# Patient Record
Sex: MALE | Race: WHITE | URBAN - METROPOLITAN AREA
[De-identification: names, ages, dates, MRNs, and addresses within clinical notes are randomized per-mention and may not be internally consistent; named-entity substitution may affect disease eponyms.]

---

## 2022-03-28 ENCOUNTER — INPATIENT (INPATIENT)
Facility: HOSPITAL | Age: 67
LOS: 1 days | Discharge: HOME | DRG: 189 | End: 2022-03-30
Attending: INTERNAL MEDICINE | Admitting: INTERNAL MEDICINE
Payer: MEDICARE

## 2022-03-28 VITALS — OXYGEN SATURATION: 99 % | HEART RATE: 117 BPM

## 2022-03-28 DIAGNOSIS — J96.02 ACUTE RESPIRATORY FAILURE WITH HYPERCAPNIA: ICD-10-CM

## 2022-03-28 DIAGNOSIS — J45.901 UNSPECIFIED ASTHMA WITH (ACUTE) EXACERBATION: ICD-10-CM

## 2022-03-28 DIAGNOSIS — I10 ESSENTIAL (PRIMARY) HYPERTENSION: ICD-10-CM

## 2022-03-28 DIAGNOSIS — H40.9 UNSPECIFIED GLAUCOMA: ICD-10-CM

## 2022-03-28 DIAGNOSIS — I89.8 OTHER SPECIFIED NONINFECTIVE DISORDERS OF LYMPHATIC VESSELS AND LYMPH NODES: ICD-10-CM

## 2022-03-28 DIAGNOSIS — R63.8 OTHER SYMPTOMS AND SIGNS CONCERNING FOOD AND FLUID INTAKE: ICD-10-CM

## 2022-03-28 DIAGNOSIS — M10.9 GOUT, UNSPECIFIED: ICD-10-CM

## 2022-03-28 DIAGNOSIS — R74.8 ABNORMAL LEVELS OF OTHER SERUM ENZYMES: ICD-10-CM

## 2022-03-28 LAB
ALBUMIN SERPL ELPH-MCNC: 5.1 G/DL — HIGH (ref 3.3–5)
ALP SERPL-CCNC: 139 U/L — HIGH (ref 40–120)
ALT FLD-CCNC: 24 U/L — SIGNIFICANT CHANGE UP (ref 10–45)
ANION GAP SERPL CALC-SCNC: 18 MMOL/L — HIGH (ref 5–17)
ANISOCYTOSIS BLD QL: SLIGHT — SIGNIFICANT CHANGE UP
APTT BLD: 31.3 SEC — SIGNIFICANT CHANGE UP (ref 27.5–35.5)
AST SERPL-CCNC: 35 U/L — SIGNIFICANT CHANGE UP (ref 10–40)
BASE EXCESS BLDV CALC-SCNC: 0.1 MMOL/L — SIGNIFICANT CHANGE UP (ref -2–3)
BASOPHILS # BLD AUTO: 0.45 K/UL — HIGH (ref 0–0.2)
BASOPHILS NFR BLD AUTO: 2.7 % — HIGH (ref 0–2)
BILIRUB SERPL-MCNC: 0.9 MG/DL — SIGNIFICANT CHANGE UP (ref 0.2–1.2)
BUN SERPL-MCNC: 14 MG/DL — SIGNIFICANT CHANGE UP (ref 7–23)
CA-I SERPL-SCNC: 1.22 MMOL/L — SIGNIFICANT CHANGE UP (ref 1.15–1.33)
CALCIUM SERPL-MCNC: 9.7 MG/DL — SIGNIFICANT CHANGE UP (ref 8.4–10.5)
CHLORIDE SERPL-SCNC: 99 MMOL/L — SIGNIFICANT CHANGE UP (ref 96–108)
CO2 BLDV-SCNC: 29.7 MMOL/L — HIGH (ref 22–26)
CO2 SERPL-SCNC: 23 MMOL/L — SIGNIFICANT CHANGE UP (ref 22–31)
CREAT SERPL-MCNC: 0.95 MG/DL — SIGNIFICANT CHANGE UP (ref 0.5–1.3)
CRP SERPL-MCNC: 6 MG/L — HIGH (ref 0–4)
EGFR: 89 ML/MIN/1.73M2 — SIGNIFICANT CHANGE UP
EOSINOPHIL # BLD AUTO: 0 K/UL — SIGNIFICANT CHANGE UP (ref 0–0.5)
EOSINOPHIL NFR BLD AUTO: 0 % — SIGNIFICANT CHANGE UP (ref 0–6)
FERRITIN SERPL-MCNC: 244 NG/ML — SIGNIFICANT CHANGE UP (ref 30–400)
FLUAV AG NPH QL: SIGNIFICANT CHANGE UP
FLUBV AG NPH QL: SIGNIFICANT CHANGE UP
GAS PNL BLDV: 133 MMOL/L — LOW (ref 136–145)
GAS PNL BLDV: SIGNIFICANT CHANGE UP
GIANT PLATELETS BLD QL SMEAR: PRESENT — SIGNIFICANT CHANGE UP
GLUCOSE BLDC GLUCOMTR-MCNC: 148 MG/DL — HIGH (ref 70–99)
GLUCOSE SERPL-MCNC: 126 MG/DL — HIGH (ref 70–99)
HCO3 BLDV-SCNC: 28 MMOL/L — SIGNIFICANT CHANGE UP (ref 22–29)
HCT VFR BLD CALC: 47.7 % — SIGNIFICANT CHANGE UP (ref 39–50)
HGB BLD-MCNC: 16.5 G/DL — SIGNIFICANT CHANGE UP (ref 13–17)
INR BLD: 1.01 — SIGNIFICANT CHANGE UP (ref 0.88–1.16)
LYMPHOCYTES # BLD AUTO: 33.9 % — SIGNIFICANT CHANGE UP (ref 13–44)
LYMPHOCYTES # BLD AUTO: 5.65 K/UL — HIGH (ref 1–3.3)
MACROCYTES BLD QL: SLIGHT — SIGNIFICANT CHANGE UP
MANUAL SMEAR VERIFICATION: SIGNIFICANT CHANGE UP
MCHC RBC-ENTMCNC: 34.6 GM/DL — SIGNIFICANT CHANGE UP (ref 32–36)
MCHC RBC-ENTMCNC: 35 PG — HIGH (ref 27–34)
MCV RBC AUTO: 101.1 FL — HIGH (ref 80–100)
MONOCYTES # BLD AUTO: 1.48 K/UL — HIGH (ref 0–0.9)
MONOCYTES NFR BLD AUTO: 8.9 % — SIGNIFICANT CHANGE UP (ref 2–14)
NEUTROPHILS # BLD AUTO: 7.6 K/UL — HIGH (ref 1.8–7.4)
NEUTROPHILS NFR BLD AUTO: 44.7 % — SIGNIFICANT CHANGE UP (ref 43–77)
NEUTS BAND # BLD: 0.9 % — SIGNIFICANT CHANGE UP (ref 0–8)
NT-PROBNP SERPL-SCNC: 315 PG/ML — HIGH (ref 0–300)
PCO2 BLDV: 58 MMHG — HIGH (ref 42–55)
PH BLDV: 7.29 — LOW (ref 7.32–7.43)
PLAT MORPH BLD: NORMAL — SIGNIFICANT CHANGE UP
PLATELET # BLD AUTO: 174 K/UL — SIGNIFICANT CHANGE UP (ref 150–400)
PO2 BLDV: <29 MMHG — LOW (ref 25–45)
POTASSIUM BLDV-SCNC: 4.1 MMOL/L — SIGNIFICANT CHANGE UP (ref 3.5–5.1)
POTASSIUM SERPL-MCNC: 4.6 MMOL/L — SIGNIFICANT CHANGE UP (ref 3.5–5.3)
POTASSIUM SERPL-SCNC: 4.6 MMOL/L — SIGNIFICANT CHANGE UP (ref 3.5–5.3)
PROCALCITONIN SERPL-MCNC: 0.07 NG/ML — SIGNIFICANT CHANGE UP (ref 0.02–0.1)
PROT SERPL-MCNC: 7.7 G/DL — SIGNIFICANT CHANGE UP (ref 6–8.3)
PROTHROM AB SERPL-ACNC: 12 SEC — SIGNIFICANT CHANGE UP (ref 10.5–13.4)
RBC # BLD: 4.72 M/UL — SIGNIFICANT CHANGE UP (ref 4.2–5.8)
RBC # FLD: 12.8 % — SIGNIFICANT CHANGE UP (ref 10.3–14.5)
RBC BLD AUTO: ABNORMAL
RSV RNA NPH QL NAA+NON-PROBE: SIGNIFICANT CHANGE UP
SAO2 % BLDV: 36.7 % — LOW (ref 67–88)
SARS-COV-2 RNA SPEC QL NAA+PROBE: SIGNIFICANT CHANGE UP
SODIUM SERPL-SCNC: 140 MMOL/L — SIGNIFICANT CHANGE UP (ref 135–145)
SPHEROCYTES BLD QL SMEAR: SLIGHT — SIGNIFICANT CHANGE UP
TROPONIN T SERPL-MCNC: 0.01 NG/ML — SIGNIFICANT CHANGE UP (ref 0–0.01)
VARIANT LYMPHS # BLD: 8.9 % — HIGH (ref 0–6)
WBC # BLD: 16.66 K/UL — HIGH (ref 3.8–10.5)
WBC # FLD AUTO: 16.66 K/UL — HIGH (ref 3.8–10.5)

## 2022-03-28 PROCEDURE — 99291 CRITICAL CARE FIRST HOUR: CPT

## 2022-03-28 PROCEDURE — 71275 CT ANGIOGRAPHY CHEST: CPT | Mod: 26,MA

## 2022-03-28 PROCEDURE — 74174 CTA ABD&PLVS W/CONTRAST: CPT | Mod: 26,MA

## 2022-03-28 PROCEDURE — 99292 CRITICAL CARE ADDL 30 MIN: CPT

## 2022-03-28 PROCEDURE — 99223 1ST HOSP IP/OBS HIGH 75: CPT | Mod: GC

## 2022-03-28 PROCEDURE — 71045 X-RAY EXAM CHEST 1 VIEW: CPT | Mod: 26

## 2022-03-28 PROCEDURE — 93010 ELECTROCARDIOGRAM REPORT: CPT

## 2022-03-28 RX ORDER — MONTELUKAST 4 MG/1
10 TABLET, CHEWABLE ORAL DAILY
Refills: 0 | Status: DISCONTINUED | OUTPATIENT
Start: 2022-03-28 | End: 2022-03-30

## 2022-03-28 RX ORDER — LABETALOL HCL 100 MG
10 TABLET ORAL ONCE
Refills: 0 | Status: COMPLETED | OUTPATIENT
Start: 2022-03-28 | End: 2022-03-28

## 2022-03-28 RX ORDER — DEXTROSE 50 % IN WATER 50 %
12.5 SYRINGE (ML) INTRAVENOUS ONCE
Refills: 0 | Status: DISCONTINUED | OUTPATIENT
Start: 2022-03-28 | End: 2022-03-30

## 2022-03-28 RX ORDER — ALLOPURINOL 300 MG
1 TABLET ORAL
Qty: 0 | Refills: 0 | DISCHARGE

## 2022-03-28 RX ORDER — BUDESONIDE AND FORMOTEROL FUMARATE DIHYDRATE 160; 4.5 UG/1; UG/1
2 AEROSOL RESPIRATORY (INHALATION)
Refills: 0 | Status: DISCONTINUED | OUTPATIENT
Start: 2022-03-28 | End: 2022-03-30

## 2022-03-28 RX ORDER — ALLOPURINOL 300 MG
200 TABLET ORAL ONCE
Refills: 0 | Status: COMPLETED | OUTPATIENT
Start: 2022-03-28 | End: 2022-03-28

## 2022-03-28 RX ORDER — LATANOPROST 0.05 MG/ML
1 SOLUTION/ DROPS OPHTHALMIC; TOPICAL AT BEDTIME
Refills: 0 | Status: DISCONTINUED | OUTPATIENT
Start: 2022-03-28 | End: 2022-03-30

## 2022-03-28 RX ORDER — INSULIN LISPRO 100/ML
VIAL (ML) SUBCUTANEOUS
Refills: 0 | Status: DISCONTINUED | OUTPATIENT
Start: 2022-03-28 | End: 2022-03-30

## 2022-03-28 RX ORDER — DEXTROSE 50 % IN WATER 50 %
25 SYRINGE (ML) INTRAVENOUS ONCE
Refills: 0 | Status: DISCONTINUED | OUTPATIENT
Start: 2022-03-28 | End: 2022-03-30

## 2022-03-28 RX ORDER — IPRATROPIUM/ALBUTEROL SULFATE 18-103MCG
3 AEROSOL WITH ADAPTER (GRAM) INHALATION EVERY 4 HOURS
Refills: 0 | Status: DISCONTINUED | OUTPATIENT
Start: 2022-03-28 | End: 2022-03-28

## 2022-03-28 RX ORDER — BUDESONIDE AND FORMOTEROL FUMARATE DIHYDRATE 160; 4.5 UG/1; UG/1
2 AEROSOL RESPIRATORY (INHALATION)
Refills: 0 | Status: DISCONTINUED | OUTPATIENT
Start: 2022-03-28 | End: 2022-03-28

## 2022-03-28 RX ORDER — IPRATROPIUM/ALBUTEROL SULFATE 18-103MCG
3 AEROSOL WITH ADAPTER (GRAM) INHALATION EVERY 6 HOURS
Refills: 0 | Status: DISCONTINUED | OUTPATIENT
Start: 2022-03-28 | End: 2022-03-28

## 2022-03-28 RX ORDER — SODIUM CHLORIDE 9 MG/ML
1000 INJECTION, SOLUTION INTRAVENOUS
Refills: 0 | Status: DISCONTINUED | OUTPATIENT
Start: 2022-03-28 | End: 2022-03-30

## 2022-03-28 RX ORDER — LATANOPROST 0.05 MG/ML
1 SOLUTION/ DROPS OPHTHALMIC; TOPICAL
Qty: 0 | Refills: 0 | DISCHARGE

## 2022-03-28 RX ORDER — ALLOPURINOL 300 MG
100 TABLET ORAL DAILY
Refills: 0 | Status: DISCONTINUED | OUTPATIENT
Start: 2022-03-28 | End: 2022-03-28

## 2022-03-28 RX ORDER — BRIMONIDINE TARTRATE 2 MG/MG
1 SOLUTION/ DROPS OPHTHALMIC
Qty: 0 | Refills: 0 | DISCHARGE

## 2022-03-28 RX ORDER — IPRATROPIUM/ALBUTEROL SULFATE 18-103MCG
3 AEROSOL WITH ADAPTER (GRAM) INHALATION
Refills: 0 | Status: COMPLETED | OUTPATIENT
Start: 2022-03-28 | End: 2022-03-28

## 2022-03-28 RX ORDER — TIMOLOL 0.5 %
1 DROPS OPHTHALMIC (EYE)
Refills: 0 | Status: DISCONTINUED | OUTPATIENT
Start: 2022-03-28 | End: 2022-03-30

## 2022-03-28 RX ORDER — HYDRALAZINE HCL 50 MG
10 TABLET ORAL ONCE
Refills: 0 | Status: COMPLETED | OUTPATIENT
Start: 2022-03-28 | End: 2022-03-28

## 2022-03-28 RX ORDER — COLCHICINE 0.6 MG
1 TABLET ORAL
Qty: 0 | Refills: 0 | DISCHARGE

## 2022-03-28 RX ORDER — ACETAMINOPHEN 500 MG
650 TABLET ORAL EVERY 6 HOURS
Refills: 0 | Status: DISCONTINUED | OUTPATIENT
Start: 2022-03-28 | End: 2022-03-30

## 2022-03-28 RX ORDER — COLCHICINE 0.6 MG
0.6 TABLET ORAL DAILY
Refills: 0 | Status: DISCONTINUED | OUTPATIENT
Start: 2022-03-28 | End: 2022-03-30

## 2022-03-28 RX ORDER — GLUCAGON INJECTION, SOLUTION 0.5 MG/.1ML
1 INJECTION, SOLUTION SUBCUTANEOUS ONCE
Refills: 0 | Status: DISCONTINUED | OUTPATIENT
Start: 2022-03-28 | End: 2022-03-30

## 2022-03-28 RX ORDER — TIMOLOL 0.5 %
1 DROPS OPHTHALMIC (EYE)
Qty: 0 | Refills: 0 | DISCHARGE

## 2022-03-28 RX ORDER — AMLODIPINE BESYLATE 2.5 MG/1
5 TABLET ORAL EVERY 24 HOURS
Refills: 0 | Status: DISCONTINUED | OUTPATIENT
Start: 2022-03-28 | End: 2022-03-30

## 2022-03-28 RX ORDER — FUROSEMIDE 40 MG
40 TABLET ORAL ONCE
Refills: 0 | Status: COMPLETED | OUTPATIENT
Start: 2022-03-28 | End: 2022-03-28

## 2022-03-28 RX ORDER — DEXTROSE 50 % IN WATER 50 %
15 SYRINGE (ML) INTRAVENOUS ONCE
Refills: 0 | Status: DISCONTINUED | OUTPATIENT
Start: 2022-03-28 | End: 2022-03-30

## 2022-03-28 RX ORDER — LEVALBUTEROL 1.25 MG/.5ML
0.63 SOLUTION, CONCENTRATE RESPIRATORY (INHALATION)
Refills: 0 | Status: COMPLETED | OUTPATIENT
Start: 2022-03-28 | End: 2022-03-28

## 2022-03-28 RX ORDER — LEVALBUTEROL 1.25 MG/.5ML
0.63 SOLUTION, CONCENTRATE RESPIRATORY (INHALATION) EVERY 6 HOURS
Refills: 0 | Status: DISCONTINUED | OUTPATIENT
Start: 2022-03-28 | End: 2022-03-30

## 2022-03-28 RX ORDER — IPRATROPIUM BROMIDE 0.2 MG/ML
500 SOLUTION, NON-ORAL INHALATION EVERY 6 HOURS
Refills: 0 | Status: DISCONTINUED | OUTPATIENT
Start: 2022-03-28 | End: 2022-03-30

## 2022-03-28 RX ORDER — BRIMONIDINE TARTRATE 2 MG/MG
1 SOLUTION/ DROPS OPHTHALMIC THREE TIMES A DAY
Refills: 0 | Status: DISCONTINUED | OUTPATIENT
Start: 2022-03-28 | End: 2022-03-30

## 2022-03-28 RX ORDER — LABETALOL HCL 100 MG
20 TABLET ORAL ONCE
Refills: 0 | Status: COMPLETED | OUTPATIENT
Start: 2022-03-28 | End: 2022-03-28

## 2022-03-28 RX ORDER — ALLOPURINOL 300 MG
300 TABLET ORAL EVERY 24 HOURS
Refills: 0 | Status: DISCONTINUED | OUTPATIENT
Start: 2022-03-29 | End: 2022-03-30

## 2022-03-28 RX ORDER — MONTELUKAST 4 MG/1
1 TABLET, CHEWABLE ORAL
Qty: 0 | Refills: 0 | DISCHARGE

## 2022-03-28 RX ADMIN — LEVALBUTEROL 0.63 MILLIGRAM(S): 1.25 SOLUTION, CONCENTRATE RESPIRATORY (INHALATION) at 16:25

## 2022-03-28 RX ADMIN — Medication 20 MILLIGRAM(S): at 15:52

## 2022-03-28 RX ADMIN — LEVALBUTEROL 0.63 MILLIGRAM(S): 1.25 SOLUTION, CONCENTRATE RESPIRATORY (INHALATION) at 16:35

## 2022-03-28 RX ADMIN — Medication 1 DROP(S): at 23:41

## 2022-03-28 RX ADMIN — BUDESONIDE AND FORMOTEROL FUMARATE DIHYDRATE 2 PUFF(S): 160; 4.5 AEROSOL RESPIRATORY (INHALATION) at 23:24

## 2022-03-28 RX ADMIN — Medication 1.25 MILLIGRAM(S): at 23:04

## 2022-03-28 RX ADMIN — Medication 3 MILLILITER(S): at 15:50

## 2022-03-28 RX ADMIN — LEVALBUTEROL 0.63 MILLIGRAM(S): 1.25 SOLUTION, CONCENTRATE RESPIRATORY (INHALATION) at 16:30

## 2022-03-28 RX ADMIN — Medication 0.6 MILLIGRAM(S): at 23:24

## 2022-03-28 RX ADMIN — BRIMONIDINE TARTRATE 1 DROP(S): 2 SOLUTION/ DROPS OPHTHALMIC at 23:26

## 2022-03-28 RX ADMIN — Medication 3 MILLILITER(S): at 16:10

## 2022-03-28 RX ADMIN — MONTELUKAST 10 MILLIGRAM(S): 4 TABLET, CHEWABLE ORAL at 23:03

## 2022-03-28 RX ADMIN — LATANOPROST 1 DROP(S): 0.05 SOLUTION/ DROPS OPHTHALMIC; TOPICAL at 23:25

## 2022-03-28 RX ADMIN — Medication 0.25 MILLIGRAM(S): at 16:21

## 2022-03-28 RX ADMIN — Medication 10 MILLIGRAM(S): at 15:37

## 2022-03-28 RX ADMIN — Medication 200 MILLIGRAM(S): at 23:04

## 2022-03-28 RX ADMIN — Medication 10 MILLIGRAM(S): at 16:15

## 2022-03-28 RX ADMIN — Medication 3 MILLILITER(S): at 15:30

## 2022-03-28 RX ADMIN — Medication 100 MILLIGRAM(S): at 21:04

## 2022-03-28 RX ADMIN — AMLODIPINE BESYLATE 5 MILLIGRAM(S): 2.5 TABLET ORAL at 19:59

## 2022-03-28 RX ADMIN — Medication 40 MILLIGRAM(S): at 16:03

## 2022-03-28 NOTE — ED ADULT NURSE REASSESSMENT NOTE - NS ED NURSE REASSESS COMMENT FT1
received pt from ABILIO Lord, pt not in any acute distress at this time. continues on bipap, tolerating treatment, continues on cardiac monitor. pt pending disposition

## 2022-03-28 NOTE — H&P ADULT - PROBLEM SELECTOR PLAN 2
Hx of HTN and was on amlodipine 2 years ago. BP was reportedly normal at urgent care, so may be acute rise. P/w 229/169 (s/p IV hydralazine 10mg, labetalol 30mg) --->156/96  -start amlodipine 5mg and titrate up  -PRN hydralazine/ labetalol if persistently hypertensive Hx of HTN and was on amlodipine 2 years ago. BP was reportedly normal at urgent care, so may be acute rise. P/w 229/169 (s/p IV hydralazine 10mg, labetalol 30mg) --->156/96  - Pt hypertensive 200s/100s, asymptomatic -- given Enaliprat 1.25mg IVP x1   -start amlodipine 5mg and titrate up  -PRN hydralazine/ labetalol if persistently hypertensive

## 2022-03-28 NOTE — ED ADULT NURSE NOTE - CHIEF COMPLAINT QUOTE
Pt BIBA from [facility] MD after shortness of breath starting last night. Hx of asthma, pt with wheezing and tachypnea on arrival to . Pt endorses chest tightness, breathing worsening overnight with +orthopnea. Pt placed on CPAP by EMS< given "3 tabs of steroids" at  and 60mg solumderol by EMS. Pt received x2 combivent and 2g IV mag. Pt hypertensive, no LE edema, no hx of heart failure. +expectorant, +cough.

## 2022-03-28 NOTE — H&P ADULT - PROBLEM SELECTOR PLAN 5
On allopurinol and colchicine. Follows w/ outpt rheum  -c/w home allopurinol and colchicine for gout ppx

## 2022-03-28 NOTE — H&P ADULT - PROBLEM SELECTOR PLAN 7
FEN: replete mag>2 and K>4  Code status: FULL    Dispo: [facility]  Lazaro: No  Access/Lines: IV  Sepsis: No

## 2022-03-28 NOTE — H&P ADULT - PROBLEM SELECTOR PLAN 4
Hx of Gout. On allopurinol and colchicine Calcified hilar and mediastinal lymph nodes + splenic calcification on CT: Incidental findings. Pt is covid vaccinated and never smoker. Says he traveled to [place] 40 years ago where he had illness only w/ diarrhea. Denies hemoptysis, fever, chills, cough, TB infection at that time. Differential is broad and includes hemangioma/lymphoma/sarcoid/prior infection w/ histo/TB, ect.  -obtain further collateral information from allergiest/immunologist   -pulm f/u on dc

## 2022-03-28 NOTE — ED PROVIDER NOTE - CLINICAL SUMMARY MEDICAL DECISION MAKING FREE TEXT BOX
Pt p/w SOB, preceding URI. No known f/c. Vaccinated w/o boosted against COVID. No recent travel or sick contacts. Never smoker. No hospitalized / intubated for asthma. No known CAD / CHF. Uncontrolled HTN w/ prior hx, off meds stating b/c BP improved. No peripheral edema. No failure noted on CXR. Symmetric pulses and BPs b/l but ext w/o mottled, cyanotic appearance, w/o hypoxia. Back pain w/o other sx. Bedside echo no sig pericardial effusion.   Given severe HTN, sig resp distress and back pain, consider also dissection, PE, hypertensive urgency / emergency, less likely decompensated HF, in addition asthma exacerbation. R/o COVID19 other resp viral illness.   Alcohol abuse, denies hx w/d sx.

## 2022-03-28 NOTE — CONSULT NOTE ADULT - SUBJECTIVE AND OBJECTIVE BOX
incomplete HPI: 68 yo M PMH HTN, HLD, gout, asthma (on Symbicort + montelukast - no prior flare or intubation), glaucoma BIBEMS from urgent care w/ sob and wheezing the last 3 days. Pt says he had sob and wheezing the last 3 days that got really bad today where he had difficulty breathing. He says he had allergies w/ painful sinuses 7 days ago. Admits to lots of sinus/nasal secretions at that time. Denies fever, chills, productive cough sore throat. Pt also says he has not been taking his Symbicort as he ran out the last 3 weeks. Pt says he is having insurance issues and was not able to pay for the medication as it was too expensive. Pt initially went to urgent care where he was given prednisone 60mg and duonebs w/ no improvement. EMS gave pt solumedrol 60mg and 2mg mag and placed on bipap in transit to hospital. Pt says he also had some low back pain that is feeling better now. Says he is covid vaccinated, and never smoker. Drinks 10oz vodka/week, no hx of alcohol withdrawal. Says he follows w/ a allergist Dr. Michael Phillips, but does not have a regular PCP. Says he had BP problems in the past and was on amlodipine and statin, however, stopped taking as home BP checks was normal. Stopped taking statin 2/2 myalgias. Pt not aware of CT findings of calcifications in spleen and lymph nodes, says he traveled to [place] 40 years ago where he had illness only w/ diarrhea. Denies hemoptysis, fever, chills, cough, TB infection at that time.     Consult reason: hypercapnic respiratory failure requiring bipap  ED vitals: 99F, 229/169--->156/96  Labs significant: WBC 16.6, alk phos=139, Peak flow = 250, VBG PH 7.29, PCO2 58, HCO3 23 (no prior), flu/RSV/covid neg  Imaging/EK. EKG sinus tachycardia  2. CT Chest/Abd: No PE or dissection. Calcified hilar and mediastinal lymph nodes + splenic calcification  3. CXR: hyperinflated lungs     -At urgent care/EMS: Urgent care given prednisone 60mg and duonebs w/ no improvement, EMS gave pt solumedrol 60mg and 2mg mag and placed on bipap in transit to hospital.      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 Admits to marijuana use. Drinks about 10oz of vodka/week, no hx of alcohol withdrawal.        PAST MEDICAL & SURGICAL HISTORY:      FAMILY HISTORY:  :    No known cardiovascular or pulmonary family history     ROS:  See HPI     PHYSICAL EXAM    ICU Vital Signs Last 24 Hrs  T(C): 37.5 (28 Mar 2022 16:49), Max: 37.5 (28 Mar 2022 16:49)  T(F): 99.5 (28 Mar 2022 16:49), Max: 99.5 (28 Mar 2022 16:49)  HR: 98 (28 Mar 2022 19:08) (94 - 120)  BP: 179/99 (28 Mar 2022 19:08) (156/96 - 240/164)  BP(mean): --  ABP: --  ABP(mean): --  RR: 20 (28 Mar 2022 19:08) (20 - 28)  SpO2: 96% (28 Mar 2022 19:08) (96% - 100%)      General: breathing comfortably on nasal canula, no accessory muscle use, speaking full sentences  HEENT:  +stye R eye               Lungs: b/l expiratory wheezing in upper lung fields, no crackles  Cardiovascular: RRR, no murmur  Gastrointestinal: Soft, non-tender to palpation, normal bowel sounds  Musculoskeletal: No clubbing.  Moves all extremities.    Skin: Warm.  Intact  Neurological: No motor or sensory deficit, A+Ox3      LABS:                          16.5   16.66 )-----------( 174      ( 28 Mar 2022 15:42 )             47.7                                                   140  |  99  |  14  ----------------------------<  126<H>  4.6   |  23  |  0.95    Ca    9.7      28 Mar 2022 15:42    TPro  7.7  /  Alb  5.1<H>  /  TBili  0.9  /  DBili  x   /  AST  35  /  ALT  24  /  AlkPhos  139<H>  03-      PT/INR - ( 28 Mar 2022 15:42 )   PT: 12.0 sec;   INR: 1.01          PTT - ( 28 Mar 2022 15:42 )  PTT:31.3 sec                                           CARDIAC MARKERS ( 28 Mar 2022 15:42 )  x     / 0.01 ng/mL / x     / x     / x                                                LIVER FUNCTIONS - ( 28 Mar 2022 15:42 )  Alb: 5.1 g/dL / Pro: 7.7 g/dL / ALK PHOS: 139 U/L / ALT: 24 U/L / AST: 35 U/L / GGT: x                                                                                                                                           CXR:    ECHO:    MEDICATIONS  (STANDING):    MEDICATIONS  (PRN):

## 2022-03-28 NOTE — H&P ADULT - NSHPPHYSICALEXAM_GEN_ALL_CORE
VITAL SIGNS:  T(C): 36.8 (03-28-22 @ 19:22), Max: 37.5 (03-28-22 @ 16:49)  T(F): 98.3 (03-28-22 @ 19:22), Max: 99.5 (03-28-22 @ 16:49)  HR: 98 (03-28-22 @ 19:08) (94 - 120)  BP: 179/99 (03-28-22 @ 19:08) (156/96 - 240/164)  RR: 20 (03-28-22 @ 19:08) (20 - 28)  SpO2: 96% (03-28-22 @ 19:08) (96% - 100%)      PHYSICAL EXAM:    Constitutional: sitting up in bed, on 4L NC   Eyes: PERRL, EOMI, clear conjunctiva  ENT: no nasal discharge; no oropharyngeal erythema or exudates; MMM  Neck: supple; no JVD   Respiratory: audible wheezing in upper lung fields bilaterally   Cardiac: +S1/S2; RRR; no M/R/G  Gastrointestinal: soft, NT/ND; no rebound or guarding; +BSx4  Extremities: WWP, no clubbing or cyanosis; no peripheral edema  Musculoskeletal: NROM x4; no joint swelling, tenderness or erythema  Vascular: 2+ radial, femoral, DP/PT pulses B/L  Dermatologic: skin warm, dry and intact; no rashes, wounds, or scars  Neurologic: AAOx3; CNII-XII grossly intact; no focal deficits  Psychiatric: affect and characteristics of appearance, verbalizations, behaviors are appropriate

## 2022-03-28 NOTE — ED PROVIDER NOTE - PHYSICAL EXAMINATION
Constitutional: Well appearing, awake, alert, oriented to person, place, time/situation and in mod resp distress  ENMT: Airway patent. Normal MM. no lip cyanosis.   Eyes: Clear bilaterally  Cardiac: tachycardic rate, regular rhythm.  Heart sounds S1, S2.  No murmurs, rubs or gallops. + JVD  Respiratory: Tight. Diffuse exp wheezing. No R/R appreciated. Dec air entry. + increased WOB, tachypnea, and accessory mm use. Pt speaks in short sentences.   Gastrointestinal: Abd soft, NT, ND, NABS. No guarding, rebound, or rigidity. No pulsatile abdominal masses.   Musculoskeletal: Range of motion is not limited. no peripheral edema  Vasc: 2+ pulses x 4 ext. mottled cyanotic appearance x 4 ext   Neuro: Alert and oriented x 3, face symmetric and speech fluent. grossly non focal  Skin: Skin above in vasc. otherwise, warm, dry and intact. No evidence of rash.  Psych: Alert and oriented to person, place, time/situation. normal mood and affect. no apparent risk to self or others.

## 2022-03-28 NOTE — ED ADULT NURSE REASSESSMENT NOTE - NS ED NURSE REASSESS COMMENT FT1
Pt pending ct scan. Waiting for pt to be able to tolerating lying flat. Bipap remains in placed. ED attending and primary/ secondary RN  remain at bedside. Pt placed on cardiac monitor defib pads.

## 2022-03-28 NOTE — ED ADULT NURSE REASSESSMENT NOTE - NS ED NURSE REASSESS COMMENT FT1
Pt wheeled to CT scan accompanied by ED attending and primary/ secondary RNs on cardiac monitor and Bipap. Pt tolerated the procedure. Pt wheeled back to ER. [facility] remains in placed.

## 2022-03-28 NOTE — CONSULT NOTE ADULT - ASSESSMENT
68 yo M PMH HTN, HLD, gout, asthma (on Symbicort + montelukast - no prior flare or intubation), glaucoma BIBEMS from urgent care w/ sob and wheezing the last 3 days found to have hypercapnic respiratory failure 2/2 asthma exacerbation (peak flow 250) 2/2 running out of home Symbicort    # Neuro  - No active issues    # Eyes  1. Glaucoma: Hx of glaucoma  -c/w home Alphagan TID, timolol BID, Latanoprost BID    # Pulm  1. Hypercapnic respiratory failure 2/2 asthma flare: 2/2 running out of home Symbicort combined w/ possible recent URI. S/p prednisone 60mg and duonebs (urgent care), solumedrol 60mg and 2mg mag and placed on bipap in transit to hospital. VBG PH 7.29, PCO2 58, HCO3 23 (no prior), flu/RSV/covid neg. CXR w/ hyperinflated lungs. Current Peak flow=250 (does not know his baseline). Currently breathing comfortably on nasal canula, w/ mild expiratory wheezing  -c/w solumedrol 60mg qd for 5 days for asthma flare  -c/w home Symbicort 160/4.5 -2 puff/BID   -c/w home montelukast 10mg qd  -c/w PRN duoneb q4  -daily peakflow  -bipap at standby     2. Calcified hilar and mediastinal lymph nodes + splenic calcification on CT: Incidental findings. Pt is covid vaccinated and never smoker. Says he traveled to [place] 40 years ago where he had illness only w/ diarrhea. Denies hemoptysis, fever, chills, cough, TB infection at that time. Differential is broad and includes hemangioma/lymphoma/sarcoid/prior infection w/ histo/TB, ect.  -obtain further collateral information from allergiest/immunologist   -pulm f/u on dc    # Cardiac  1. HTN: Hx of HTN and was on amlodipine 2 years ago. BP was reportedly normal at urgent care, so may be acute rise. P/w 229/169 (s/p IV hydralazine 10mg, labetalol 30mg) --->156/96  -start amlodipine 5mg and titrate up  -PRN hydralazine/labetolol if persistently hypertensive    2. Tachycardia: Sinus tachycardia on ekg, may be 2/2 receivng duonebs vs agitation from bipap. No PE on CTA, no signs of infection/sepsis. Does not appear dry on exam.  -monitor HR     # GI  1. Elevated alk phos=139  -f/u GGT    # Renal  - No active issues    # Endo  - No active issues    # Heme  1. Leukocytosis: Most likely 2/2 recent steroid use for asthma. No signs of infection.  -trend WBC    #Rheum  1. Gout: Follows w/ outpt rheum  -c/w home allopurinol and colchicine for gout ppx    #ID  - No active issues    PPx: SCD, PADUA=1  FEN: replete mag>2 and K>4  Code status: FULL  Dispo: [facility]  Lazaro: No  Access/Lines: IV  Sepsis: No

## 2022-03-28 NOTE — PATIENT PROFILE ADULT - FUNCTIONAL ASSESSMENT - BASIC MOBILITY SECTION LABEL
Patient became increasingly agitated, pulling at monitoring and trying to get out of bed. Unable to redirect despite RN sitting bedside with patient. [facility] notified, orders received for 0.5 mg Haldol. VSS, will continue to monitor.   .

## 2022-03-28 NOTE — ED ADULT TRIAGE NOTE - CHIEF COMPLAINT QUOTE
Pt BIBA from [facility] MD after shortness of breath starting last night. Hx of asthma, pt with wheezing and tachypnea on arrival to . Pt endorses chest tightness, breathing worsening overnight with +orthopnea. Pt placed on CPAP by EMS< given "3 tabs of steroids" at  and 60mg solumderol by EMS. Pt received x2 combivent and 2g IV mag. Pt hypertensive, no LE edema, no hx of heart failure. +expectorant, +cough.

## 2022-03-28 NOTE — H&P ADULT - PROBLEM SELECTOR PLAN 1
Hypercapnic respiratory failure 2/2 asthma flare 2/2 running out of home Symbicort combined w/ possible recent URI. S/p prednisone 60mg and duonebs (urgent care), solumedrol 60mg and 2mg mag and placed on bipap in transit to hospital. VBG PH 7.29, PCO2 58, HCO3 23 (no prior), flu/RSV/covid neg. CXR w/ hyperinflated lungs. Current Peak flow=250 (does not know his baseline). Currently breathing comfortably on nasal canula, w/ mild expiratory wheezing  -c/w solumedrol 60mg qd for 5 days for asthma flare  -c/w [place] while on steroids and monitor fingersticks   -c/w home Symbicort 160/4.5 -2 puff/BID   -c/w home montelukast 10mg qd  -c/w PRN duoneb q4  -daily peakflow  - BiPAP at bedside if needed     #Leukocytosis  Likely reactive 2/2 i/s/o steroid usage  - continue to monitor

## 2022-03-28 NOTE — PATIENT PROFILE ADULT - FALL HARM RISK - HARM RISK INTERVENTIONS
Assistance with ambulation/Assistance OOB with selected safe patient handling equipment/Communicate Risk of Fall with Harm to all staff/Discuss with provider need for PT consult/Monitor gait and stability/Provide patient with walking aids - walker, cane, crutches/Reinforce activity limits and safety measures with patient and family/Tailored Fall Risk Interventions/Use of alarms - bed, chair and/or voice tab/Visual Cue: Yellow wristband and red socks/Bed in lowest position, wheels locked, appropriate side rails in place/Call bell, personal items and telephone in reach/Instruct patient to call for assistance before getting out of bed or chair/Non-slip footwear when patient is out of bed/[place] to call system/Physically safe environment - no spills, clutter or unnecessary equipment/Purposeful Proactive Rounding/Room/bathroom lighting operational, light cord in reach

## 2022-03-28 NOTE — ED PROVIDER NOTE - PROGRESS NOTE DETAILS
AZEBU  consulted and will see the pt MICU  consulted and will see the pt. pt much improved, continued on BiPAP Pt seen by MICU team, attending Dr Roberts. Admit to [facility]. BiPAP removed, pt placed on nasal canula, maintain BiPAP next to pt on standby in case of need. Keep NPO

## 2022-03-28 NOTE — H&P ADULT - NSHPSOCIALHISTORY_GEN_ALL_CORE
Pt currently unemployed  Denies tobacco use, illicit drug use  Drinks about 10 ounces of alcohol a week (measures his drinks), no hx of withdrawal  functions independently, performs ADLs

## 2022-03-28 NOTE — H&P ADULT - ASSESSMENT
66 yo M PMH HTN, HLD, gout, asthma (on Symbicort + montelukast - no prior flare or intubation), glaucoma BIBEMS from urgent care w/ sob and wheezing the last 3 days found to have hypercapnic respiratory failure 2/2 asthma exacerbation (peak flow 250) 2/2 running out of home Symbicort. Admitted to [facility] for further management.

## 2022-03-28 NOTE — H&P ADULT - HISTORY OF PRESENT ILLNESS
68 yo male with PMHx of gout, asthma, ___ presents to [facility] from urgent care for worsening shortness of breath. Pt ran out of home Symbicort about three weeks prior and has not been taking his home medications. Pt notes he has had worsening SOB. Pt was complaining of back pain as well. Per patient, he has been told that he has hypertension intermittently however has had normal BP readings prior at his allergist and denies being on any current medication for his hypertension.     ED Vitals: /142 --> 179/99; HR ; T 98.3 oral, 99% SpO2 on BiPAP -->96% SpO2 on 4L NC   ED Labs: WBC 16.66, Hgb 16.5, Plt 174, Na 140, K 4.6, BUN 14, Cr 0.95, Alk Phos 139, CRP 6.0, vbg: pH 7.29, CO2 58, O2 <29, Bicarb 28  Imaging: CXR with no acute abnormality; CT angio chest aorta: no dissection or PE, mildly dilated aortic root 3.5cm, numerous splenic small calcified granulomas   ED Interventions: Furosemide 40mg IVP x1, Hydralazine 10mg IVP x1, Labetalol 10mg IVP x1 and 20mg IVP x1, Terbutaline 0.25mg subcutaneous x1, duonebs x3, Levalbuterol inhalation x3    66 yo male with PMHx of gout, asthma, ___ presents to [facility] from urgent care for worsening shortness of breath. At urgent care, patient received duonebs x3 and steroids and was then sent to ED. Pt states that she ran out of home Symbicort about three weeks prior and has not been taking his home medications. Pt notes he has had worsening SOB. Pt was complaining of back pain as well. Per patient, he has been told that he has hypertension intermittently however has had normal BP readings prior at his allergist and denies being on any current medication for his hypertension.     ED Vitals: /142 --> 179/99; HR ; T 98.3 oral, 99% SpO2 on BiPAP -->96% SpO2 on 4L NC   ED Labs: WBC 16.66, Hgb 16.5, Plt 174, Na 140, K 4.6, BUN 14, Cr 0.95, Alk Phos 139, CRP 6.0, vbg: pH 7.29, CO2 58, O2 <29, Bicarb 28  Imaging: CXR with no acute abnormality; CT angio chest aorta: no dissection or PE, mildly dilated aortic root 3.5cm, numerous splenic small calcified granulomas   ED Interventions: Furosemide 40mg IVP x1, Hydralazine 10mg IVP x1, Labetalol 10mg IVP x1 and 20mg IVP x1, Terbutaline 0.25mg subcutaneous x1, duonebs x3, Levalbuterol inhalation x3    66 yo M PMH HTN, HLD, gout, asthma (on Symbicort + montelukast - no prior flare or intubation), glaucoma BIBEMS from urgent care w/ sob and wheezing the last 3 days. Pt says he had sob and wheezing the last 3 days that got really bad today where he had difficulty breathing. He says he had allergies w/ painful sinuses 7 days ago. Admits to lots of sinus/nasal secretions at that time. Denies fever, chills, productive cough sore throat. Pt also says he has not been taking his Symbicort as he ran out the last 3 weeks. Pt says he is having insurance issues and was not able to pay for the medication as it was too expensive. Pt initially went to urgent care where he was given prednisone 60mg and duonebs w/ no improvement. EMS gave pt solumedrol 60mg and 2mg mag and placed on bipap in transit to hospital. Pt says he also had some low back pain that is feeling better now. Says he is covid vaccinated, and never smoker. Drinks 10oz vodka/week, no hx of alcohol withdrawal. Says he follows w/ a allergist Dr. Michael Phillips, but does not have a regular PCP. Says he had BP problems in the past and was on amlodipine and statin, however, stopped taking as home BP checks was normal. Stopped taking statin 2/2 myalgias. Pt not aware of CT findings of calcifications in spleen and lymph nodes, says he traveled to [place] 40 years ago where he had illness only w/ diarrhea. Denies hemoptysis, fever, chills, cough, TB infection at that time.       ED Vitals: /142 --> 179/99; HR ; T 98.3 oral, 99% SpO2 on BiPAP -->96% SpO2 on 4L NC   ED Labs: WBC 16.66, Hgb 16.5, Plt 174, Na 140, K 4.6, BUN 14, Cr 0.95, Alk Phos 139, CRP 6.0, vbg: pH 7.29, CO2 58, O2 <29, Bicarb 28  Imaging: CXR with no acute abnormality; CT angio chest aorta: no dissection or PE, mildly dilated aortic root 3.5cm, numerous splenic small calcified granulomas   ED Interventions: Furosemide 40mg IVP x1, Hydralazine 10mg IVP x1, Labetalol 10mg IVP x1 and 20mg IVP x1, Terbutaline 0.25mg subcutaneous x1, duonebs x3, Levalbuterol inhalation x3

## 2022-03-28 NOTE — ED ADULT NURSE NOTE - OBJECTIVE STATEMENT
Pt is 67 y.o male client BIBA from [facility] complaining of sob and chest tightness from last night. Pt has history of asthma and hypertension. Pt is wheezing, tachypneic and hypertensive upon arrival to urgent care. Pt endorses chest tightness, breathing worsening overnight with orthopnea. Pt placed on CPAP by EMS had 3 tablets of steroids at [facility], had 60 mg iv of solumedrol by EMS, 2grams of IV mag and 2x Combivent. Pt has productive cough, no edema present and pt denies heart failure.

## 2022-03-28 NOTE — H&P ADULT - NSHPLABSRESULTS_GEN_ALL_CORE
.  LABS:                         16.5   16.66 )-----------( 174      ( 28 Mar 2022 15:42 )             47.7     03-28    140  |  99  |  14  ----------------------------<  126<H>  4.6   |  23  |  0.95    Ca    9.7      28 Mar 2022 15:42    TPro  7.7  /  Alb  5.1<H>  /  TBili  0.9  /  DBili  x   /  AST  35  /  ALT  24  /  AlkPhos  139<H>  03-28    PT/INR - ( 28 Mar 2022 15:42 )   PT: 12.0 sec;   INR: 1.01          PTT - ( 28 Mar 2022 15:42 )  PTT:31.3 sec    Serum Pro-Brain Natriuretic Peptide: 315 pg/mL (03-28 @ 15:42)        RADIOLOGY, EKG & ADDITIONAL TESTS: Reviewed. LABS:                         16.5   16.66 )-----------( 174      ( 28 Mar 2022 15:42 )             47.7     03-28    140  |  99  |  14  ----------------------------<  126<H>  4.6   |  23  |  0.95    Ca    9.7      28 Mar 2022 15:42    TPro  7.7  /  Alb  5.1<H>  /  TBili  0.9  /  DBili  x   /  AST  35  /  ALT  24  /  AlkPhos  139<H>  03-28    PT/INR - ( 28 Mar 2022 15:42 )   PT: 12.0 sec;   INR: 1.01          PTT - ( 28 Mar 2022 15:42 )  PTT:31.3 sec    Serum Pro-Brain Natriuretic Peptide: 315 pg/mL (03-28 @ 15:42)        RADIOLOGY, EKG & ADDITIONAL TESTS: Reviewed.

## 2022-03-28 NOTE — ED PROVIDER NOTE - OBJECTIVE STATEMENT
Pt w/ PMHx HTN (off meds x 1-2 years), HLD (no meds), gout, asthma (Symbicort, montelukast), PSHx appendectomy, skin cancer excision, p/w SOB. Sx onset of sinus pressure and congestion 2-3 days ago, w/ progression of dry cough, wheezing, back pain. he reports some diarrhea. He is vaccinated x 2 against COVID19, but never boosted. No recent travel. No known sick contacts. No known hx CAD or CHF. + FHx CAD Dad age 61. he states he had normal stress/echo approx 15 years ago. No hx hospitalizations / intubations. Never smoker. He denies CP. He reports back pain, described as tightness.  Pt went to , given duonebs, Prednisone 60 mg PO  EMS called, given Magnesium Sulfate 2 g IV, and Solumedrol 60 mg IV, and placed on CPAP. Reportedly, pt was never hypoxia. Pt w/ PMHx HTN (off meds x 1-2 years), HLD (no meds), gout, asthma (Symbicort, montelukast), PSHx appendectomy, skin cancer excision, p/w SOB. Sx onset of sinus pressure and congestion approx 10 days ago ago, w/ progression of dry cough approx 1 week ago. Last night into today, pt began having wheezing, SOB, back pain. he reports some diarrhea this morning. He is vaccinated x 2 against COVID19, but never boosted. No recent travel. No known sick contacts. No known hx CAD or CHF. + FHx CAD Dad age 61. he states he had normal stress/echo approx 15 years ago. No hx hospitalizations / intubations. Never smoker. He denies CP. He reports back pain, described as tightness.  Pt went to , given duonebs, Prednisone 60 mg PO  EMS called, given Magnesium Sulfate 2 g IV, and Solumedrol 60 mg IV, and placed on CPAP. Reportedly, pt was never hypoxia.

## 2022-03-29 LAB
A1C WITH ESTIMATED AVERAGE GLUCOSE RESULT: 5 % — SIGNIFICANT CHANGE UP (ref 4–5.6)
ANION GAP SERPL CALC-SCNC: 13 MMOL/L — SIGNIFICANT CHANGE UP (ref 5–17)
BASOPHILS # BLD AUTO: 0.02 K/UL — SIGNIFICANT CHANGE UP (ref 0–0.2)
BASOPHILS NFR BLD AUTO: 0.2 % — SIGNIFICANT CHANGE UP (ref 0–2)
BUN SERPL-MCNC: 22 MG/DL — SIGNIFICANT CHANGE UP (ref 7–23)
CALCIUM SERPL-MCNC: 9 MG/DL — SIGNIFICANT CHANGE UP (ref 8.4–10.5)
CHLORIDE SERPL-SCNC: 102 MMOL/L — SIGNIFICANT CHANGE UP (ref 96–108)
CHOLEST SERPL-MCNC: 202 MG/DL — HIGH
CO2 SERPL-SCNC: 22 MMOL/L — SIGNIFICANT CHANGE UP (ref 22–31)
CREAT SERPL-MCNC: 1 MG/DL — SIGNIFICANT CHANGE UP (ref 0.5–1.3)
EGFR: 84 ML/MIN/1.73M2 — SIGNIFICANT CHANGE UP
EOSINOPHIL # BLD AUTO: 0.01 K/UL — SIGNIFICANT CHANGE UP (ref 0–0.5)
EOSINOPHIL NFR BLD AUTO: 0.1 % — SIGNIFICANT CHANGE UP (ref 0–6)
ESTIMATED AVERAGE GLUCOSE: 97 MG/DL — SIGNIFICANT CHANGE UP (ref 68–114)
GGT SERPL-CCNC: 50 U/L — SIGNIFICANT CHANGE UP (ref 9–50)
GLUCOSE BLDC GLUCOMTR-MCNC: 132 MG/DL — HIGH (ref 70–99)
GLUCOSE BLDC GLUCOMTR-MCNC: 149 MG/DL — HIGH (ref 70–99)
GLUCOSE BLDC GLUCOMTR-MCNC: 152 MG/DL — HIGH (ref 70–99)
GLUCOSE BLDC GLUCOMTR-MCNC: 154 MG/DL — HIGH (ref 70–99)
GLUCOSE SERPL-MCNC: 144 MG/DL — HIGH (ref 70–99)
HCT VFR BLD CALC: 40.5 % — SIGNIFICANT CHANGE UP (ref 39–50)
HCV AB S/CO SERPL IA: 0.04 S/CO — SIGNIFICANT CHANGE UP
HCV AB SERPL-IMP: SIGNIFICANT CHANGE UP
HDLC SERPL-MCNC: 60 MG/DL — SIGNIFICANT CHANGE UP
HGB BLD-MCNC: 13.9 G/DL — SIGNIFICANT CHANGE UP (ref 13–17)
IMM GRANULOCYTES NFR BLD AUTO: 0.5 % — SIGNIFICANT CHANGE UP (ref 0–1.5)
LIPID PNL WITH DIRECT LDL SERPL: 123 MG/DL — HIGH
LYMPHOCYTES # BLD AUTO: 46.1 % — HIGH (ref 13–44)
LYMPHOCYTES # BLD AUTO: 5.93 K/UL — HIGH (ref 1–3.3)
MAGNESIUM SERPL-MCNC: 2.3 MG/DL — SIGNIFICANT CHANGE UP (ref 1.6–2.6)
MCHC RBC-ENTMCNC: 33.7 PG — SIGNIFICANT CHANGE UP (ref 27–34)
MCHC RBC-ENTMCNC: 34.3 GM/DL — SIGNIFICANT CHANGE UP (ref 32–36)
MCV RBC AUTO: 98.3 FL — SIGNIFICANT CHANGE UP (ref 80–100)
MONOCYTES # BLD AUTO: 0.74 K/UL — SIGNIFICANT CHANGE UP (ref 0–0.9)
MONOCYTES NFR BLD AUTO: 5.7 % — SIGNIFICANT CHANGE UP (ref 2–14)
NEUTROPHILS # BLD AUTO: 6.1 K/UL — SIGNIFICANT CHANGE UP (ref 1.8–7.4)
NEUTROPHILS NFR BLD AUTO: 47.4 % — SIGNIFICANT CHANGE UP (ref 43–77)
NON HDL CHOLESTEROL: 142 MG/DL — HIGH
NRBC # BLD: 0 /100 WBCS — SIGNIFICANT CHANGE UP (ref 0–0)
PHOSPHATE SERPL-MCNC: 4.2 MG/DL — SIGNIFICANT CHANGE UP (ref 2.5–4.5)
PLATELET # BLD AUTO: 159 K/UL — SIGNIFICANT CHANGE UP (ref 150–400)
POTASSIUM SERPL-MCNC: 4 MMOL/L — SIGNIFICANT CHANGE UP (ref 3.5–5.3)
POTASSIUM SERPL-SCNC: 4 MMOL/L — SIGNIFICANT CHANGE UP (ref 3.5–5.3)
RBC # BLD: 4.12 M/UL — LOW (ref 4.2–5.8)
RBC # FLD: 13.1 % — SIGNIFICANT CHANGE UP (ref 10.3–14.5)
SODIUM SERPL-SCNC: 137 MMOL/L — SIGNIFICANT CHANGE UP (ref 135–145)
TRIGL SERPL-MCNC: 94 MG/DL — SIGNIFICANT CHANGE UP
WBC # BLD: 12.87 K/UL — HIGH (ref 3.8–10.5)
WBC # FLD AUTO: 12.87 K/UL — HIGH (ref 3.8–10.5)

## 2022-03-29 PROCEDURE — 99233 SBSQ HOSP IP/OBS HIGH 50: CPT

## 2022-03-29 PROCEDURE — 99223 1ST HOSP IP/OBS HIGH 75: CPT | Mod: GC

## 2022-03-29 PROCEDURE — 99233 SBSQ HOSP IP/OBS HIGH 50: CPT | Mod: GC

## 2022-03-29 RX ADMIN — Medication 1: at 23:02

## 2022-03-29 RX ADMIN — MONTELUKAST 10 MILLIGRAM(S): 4 TABLET, CHEWABLE ORAL at 12:44

## 2022-03-29 RX ADMIN — Medication 500 MICROGRAM(S): at 16:30

## 2022-03-29 RX ADMIN — LEVALBUTEROL 0.63 MILLIGRAM(S): 1.25 SOLUTION, CONCENTRATE RESPIRATORY (INHALATION) at 04:46

## 2022-03-29 RX ADMIN — Medication 500 MICROGRAM(S): at 00:33

## 2022-03-29 RX ADMIN — Medication 1 DROP(S): at 18:57

## 2022-03-29 RX ADMIN — LEVALBUTEROL 0.63 MILLIGRAM(S): 1.25 SOLUTION, CONCENTRATE RESPIRATORY (INHALATION) at 22:42

## 2022-03-29 RX ADMIN — Medication 1: at 06:43

## 2022-03-29 RX ADMIN — BUDESONIDE AND FORMOTEROL FUMARATE DIHYDRATE 2 PUFF(S): 160; 4.5 AEROSOL RESPIRATORY (INHALATION) at 18:55

## 2022-03-29 RX ADMIN — Medication 1 DROP(S): at 05:21

## 2022-03-29 RX ADMIN — Medication 500 MICROGRAM(S): at 23:11

## 2022-03-29 RX ADMIN — Medication 0.6 MILLIGRAM(S): at 12:44

## 2022-03-29 RX ADMIN — Medication 60 MILLIGRAM(S): at 12:44

## 2022-03-29 RX ADMIN — BUDESONIDE AND FORMOTEROL FUMARATE DIHYDRATE 2 PUFF(S): 160; 4.5 AEROSOL RESPIRATORY (INHALATION) at 05:21

## 2022-03-29 RX ADMIN — BRIMONIDINE TARTRATE 1 DROP(S): 2 SOLUTION/ DROPS OPHTHALMIC at 05:21

## 2022-03-29 RX ADMIN — Medication 500 MICROGRAM(S): at 10:13

## 2022-03-29 RX ADMIN — AMLODIPINE BESYLATE 5 MILLIGRAM(S): 2.5 TABLET ORAL at 18:55

## 2022-03-29 RX ADMIN — Medication 500 MICROGRAM(S): at 04:47

## 2022-03-29 RX ADMIN — LEVALBUTEROL 0.63 MILLIGRAM(S): 1.25 SOLUTION, CONCENTRATE RESPIRATORY (INHALATION) at 10:13

## 2022-03-29 RX ADMIN — BRIMONIDINE TARTRATE 1 DROP(S): 2 SOLUTION/ DROPS OPHTHALMIC at 22:41

## 2022-03-29 RX ADMIN — LEVALBUTEROL 0.63 MILLIGRAM(S): 1.25 SOLUTION, CONCENTRATE RESPIRATORY (INHALATION) at 16:30

## 2022-03-29 RX ADMIN — BRIMONIDINE TARTRATE 1 DROP(S): 2 SOLUTION/ DROPS OPHTHALMIC at 12:44

## 2022-03-29 NOTE — PROGRESS NOTE ADULT - PROBLEM SELECTOR PLAN 5
On allopurinol and colchicine. Follows w/ outpt rheum  -c/w home allopurinol and colchicine for gout ppx
On allopurinol and colchicine. Follows w/ outpt rheum  -c/w home allopurinol and colchicine for gout ppx

## 2022-03-29 NOTE — PROGRESS NOTE ADULT - SUBJECTIVE AND OBJECTIVE BOX
SHIVA JOSHI, 67y, Male  MRN-[id number]  Patient is a 67y old  Male who presents with a chief complaint of hypercapnic respiratory failure (28 Mar 2022 19:12)      OVERNIGHT EVENTS: Tele called for 30s of atrial fibrillation v MAT, self-resolved. EKG shows sinus tach. No fever, pt asymptomatic. Duoneb changed to Xopenex/ipratropium q8H standing.     SUBJECTIVE: Patient seen and examined at bedside. Reports feeling much better-improved breathing. Denies fevers, chills, HA, chest pain, sob, nausea, vomiting, abdominal pain, diarrhea, constipation, dysuria.     12 Point ROS Negative unless noted otherwise above.  -------------------------------------------------------------------------------  VITAL SIGNS:  Vital Signs Last 24 Hrs  T(C): 36.4 (29 Mar 2022 10:00), Max: 37.5 (28 Mar 2022 16:49)  T(F): 97.6 (29 Mar 2022 10:00), Max: 99.5 (28 Mar 2022 16:49)  HR: 82 (29 Mar 2022 10:40) (80 - 120)  BP: 150/76 (29 Mar 2022 10:40) (115/74 - 240/164)  BP(mean): 104 (29 Mar 2022 10:40) (90 - 148)  RR: 18 (29 Mar 2022 08:41) (18 - 28)  SpO2: 94% (29 Mar 2022 10:40) (94% - 100%)  I&O's Summary      PHYSICAL EXAM:    General: pleasant man in NAD, well developed  HEENT: NC/AT; EOMI, PERRLA, anicteric sclera; dry mucosal membranes.  Neck: supple, trachea midline  Cardiovascular: RRR, +S1/S2; NO M/R/G  Respiratory: poor inspiratory effort, +cough, no wheezing on exam   Gastrointestinal: soft, NT/ND; +BSx4  Extremities: WWP; no edema or cyanosis  Vascular: 2+ radial, DP/PT pulses B/L  Neurological: AAOx3; no focal deficits    ALLERGIES:  Allergies    No Known Allergies    Intolerances        MEDICATIONS:  MEDICATIONS  (STANDING):  allopurinol 300 milliGRAM(s) Oral every 24 hours  amLODIPine   Tablet 5 milliGRAM(s) Oral every 24 hours  brimonidine 0.2% Ophthalmic Solution 1 Drop(s) Both EYES three times a day  budesonide 160 MICROgram(s)/formoterol 4.5 MICROgram(s) Inhaler 2 Puff(s) Inhalation two times a day  colchicine 0.6 milliGRAM(s) Oral daily  dextrose 5%. 1000 milliLiter(s) (100 mL/Hr) IV Continuous <Continuous>  dextrose 5%. 1000 milliLiter(s) (50 mL/Hr) IV Continuous <Continuous>  dextrose 50% Injectable 25 Gram(s) IV Push once  dextrose 50% Injectable 12.5 Gram(s) IV Push once  dextrose 50% Injectable 25 Gram(s) IV Push once  glucagon  Injectable 1 milliGRAM(s) IntraMuscular once  insulin lispro (ADMELOG) corrective regimen sliding scale   SubCutaneous Before meals and at bedtime  ipratropium    for Nebulization 500 MICROGram(s) Nebulizer every 6 hours  latanoprost 0.005% Ophthalmic Solution 1 Drop(s) Both EYES at bedtime  levalbuterol Inhalation 0.63 milliGRAM(s) Inhalation every 6 hours  methylPREDNISolone sodium succinate Injectable 60 milliGRAM(s) IV Push every 24 hours  montelukast 10 milliGRAM(s) Oral daily  timolol 0.5% Solution 1 Drop(s) Both EYES two times a day    MEDICATIONS  (PRN):  acetaminophen     Tablet .. 650 milliGRAM(s) Oral every 6 hours PRN Temp greater or equal to 38C (100.4F), Mild Pain (1 - 3)  dextrose Oral Gel 15 Gram(s) Oral once PRN Blood Glucose LESS THAN 70 milliGRAM(s)/deciliter      -------------------------------------------------------------------------------  LABS:                        13.9   12.87 )-----------( 159      ( 29 Mar 2022 07:26 )             40.5     03-29    137  |  102  |  22  ----------------------------<  144<H>  4.0   |  22  |  1.00    Ca    9.0      29 Mar 2022 07:26  Phos  4.2     03-29  Mg     2.3     03-29    TPro  7.7  /  Alb  5.1<H>  /  TBili  0.9  /  DBili  x   /  AST  35  /  ALT  24  /  AlkPhos  139<H>  03-28    LIVER FUNCTIONS - ( 29 Mar 2022 07:26 )  Alb: x     / Pro: x     / ALK PHOS: x     / ALT: x     / AST: x     / GGT: 50 U/L       PT/INR - ( 28 Mar 2022 15:42 )   PT: 12.0 sec;   INR: 1.01          PTT - ( 28 Mar 2022 15:42 )  PTT:31.3 sec    CAPILLARY BLOOD GLUCOSE      POCT Blood Glucose.: 152 mg/dL (29 Mar 2022 06:32)      Culture - Blood (collected 28 Mar 2022 17:27)  Source: .Blood Blood-Peripheral  Preliminary Report (29 Mar 2022 06:00):    No growth at 12 hours    Culture - Blood (collected 28 Mar 2022 17:27)  Source: .Blood Blood-Peripheral  Preliminary Report (29 Mar 2022 06:00):    No growth at 12 hours          RADIOLOGY & ADDITIONAL TESTS: Reviewed.   Transfer Note from  to [facility]   Hospital Course: 68 yo M PMH HTN, HLD, gout, asthma (on Symbicort + montelukast - no prior flare or intubation), glaucoma BIBEMS from urgent care w/ sob and wheezing the last 3 days found to have hypercapnic respiratory failure 2/2 asthma exacerbation (peak flow 250) 2/2 running out of home Symbicort and URI. In the ED, patient placed on bipap and also given Hydralazine 10mg IVP x1, Labetalol 10mg IVP x1 and 20mg IVP x1 for hypertensive urgency to /142 likely 2/2 asthma exacerbation. Admitted to [facility] where he was weaned off bipap and placed on nc. Started solumedrol 60 for 5 days. Started Noravasc 5. Duonebs changed to standing. Peak flow 210-->240. Blood pressure improved to 160/89.  Pulm consulted for asthma exacerbation. Stable and ready for step down to [facility].       SHIVA JOSHI, 67y, Male  MRN-[id number]  Patient is a 67y old  Male who presents with a chief complaint of hypercapnic respiratory failure (28 Mar 2022 19:12)      OVERNIGHT EVENTS: Tele called for 30s of atrial fibrillation v MAT, self-resolved. EKG shows sinus tach. No fever, pt asymptomatic. Duoneb changed to Xopenex/ipratropium q8H standing.     SUBJECTIVE: Patient seen and examined at bedside. Reports feeling much better-improved breathing. Denies fevers, chills, HA, chest pain, sob, nausea, vomiting, abdominal pain, diarrhea, constipation, dysuria.     12 Point ROS Negative unless noted otherwise above.  -------------------------------------------------------------------------------  VITAL SIGNS:  Vital Signs Last 24 Hrs  T(C): 36.4 (29 Mar 2022 10:00), Max: 37.5 (28 Mar 2022 16:49)  T(F): 97.6 (29 Mar 2022 10:00), Max: 99.5 (28 Mar 2022 16:49)  HR: 82 (29 Mar 2022 10:40) (80 - 120)  BP: 150/76 (29 Mar 2022 10:40) (115/74 - 240/164)  BP(mean): 104 (29 Mar 2022 10:40) (90 - 148)  RR: 18 (29 Mar 2022 08:41) (18 - 28)  SpO2: 94% (29 Mar 2022 10:40) (94% - 100%)  I&O's Summary      PHYSICAL EXAM:    General: pleasant man in NAD, well developed  HEENT: NC/AT; EOMI, PERRLA, anicteric sclera; dry mucosal membranes.  Neck: supple, trachea midline  Cardiovascular: RRR, +S1/S2; NO M/R/G  Respiratory: poor inspiratory effort, +cough, no wheezing on exam   Gastrointestinal: soft, NT/ND; +BSx4  Extremities: WWP; no edema or cyanosis  Vascular: 2+ radial, DP/PT pulses B/L  Neurological: AAOx3; no focal deficits    ALLERGIES:  Allergies    No Known Allergies    Intolerances        MEDICATIONS:  MEDICATIONS  (STANDING):  allopurinol 300 milliGRAM(s) Oral every 24 hours  amLODIPine   Tablet 5 milliGRAM(s) Oral every 24 hours  brimonidine 0.2% Ophthalmic Solution 1 Drop(s) Both EYES three times a day  budesonide 160 MICROgram(s)/formoterol 4.5 MICROgram(s) Inhaler 2 Puff(s) Inhalation two times a day  colchicine 0.6 milliGRAM(s) Oral daily  dextrose 5%. 1000 milliLiter(s) (100 mL/Hr) IV Continuous <Continuous>  dextrose 5%. 1000 milliLiter(s) (50 mL/Hr) IV Continuous <Continuous>  dextrose 50% Injectable 25 Gram(s) IV Push once  dextrose 50% Injectable 12.5 Gram(s) IV Push once  dextrose 50% Injectable 25 Gram(s) IV Push once  glucagon  Injectable 1 milliGRAM(s) IntraMuscular once  insulin lispro (ADMELOG) corrective regimen sliding scale   SubCutaneous Before meals and at bedtime  ipratropium    for Nebulization 500 MICROGram(s) Nebulizer every 6 hours  latanoprost 0.005% Ophthalmic Solution 1 Drop(s) Both EYES at bedtime  levalbuterol Inhalation 0.63 milliGRAM(s) Inhalation every 6 hours  methylPREDNISolone sodium succinate Injectable 60 milliGRAM(s) IV Push every 24 hours  montelukast 10 milliGRAM(s) Oral daily  timolol 0.5% Solution 1 Drop(s) Both EYES two times a day    MEDICATIONS  (PRN):  acetaminophen     Tablet .. 650 milliGRAM(s) Oral every 6 hours PRN Temp greater or equal to 38C (100.4F), Mild Pain (1 - 3)  dextrose Oral Gel 15 Gram(s) Oral once PRN Blood Glucose LESS THAN 70 milliGRAM(s)/deciliter      -------------------------------------------------------------------------------  LABS:                        13.9   12.87 )-----------( 159      ( 29 Mar 2022 07:26 )             40.5     03-29    137  |  102  |  22  ----------------------------<  144<H>  4.0   |  22  |  1.00    Ca    9.0      29 Mar 2022 07:26  Phos  4.2     03-29  Mg     2.3     03-29    TPro  7.7  /  Alb  5.1<H>  /  TBili  0.9  /  DBili  x   /  AST  35  /  ALT  24  /  AlkPhos  139<H>  03-28    LIVER FUNCTIONS - ( 29 Mar 2022 07:26 )  Alb: x     / Pro: x     / ALK PHOS: x     / ALT: x     / AST: x     / GGT: 50 U/L       PT/INR - ( 28 Mar 2022 15:42 )   PT: 12.0 sec;   INR: 1.01          PTT - ( 28 Mar 2022 15:42 )  PTT:31.3 sec    CAPILLARY BLOOD GLUCOSE      POCT Blood Glucose.: 152 mg/dL (29 Mar 2022 06:32)      Culture - Blood (collected 28 Mar 2022 17:27)  Source: .Blood Blood-Peripheral  Preliminary Report (29 Mar 2022 06:00):    No growth at 12 hours    Culture - Blood (collected 28 Mar 2022 17:27)  Source: .Blood Blood-Peripheral  Preliminary Report (29 Mar 2022 06:00):    No growth at 12 hours          RADIOLOGY & ADDITIONAL TESTS: Reviewed.   Transfer Note from  to [facility]   Hospital Course: 68 yo M PMH HTN, HLD, gout, asthma (on Symbicort + montelukast - no prior flare or intubation), glaucoma BIBEMS from urgent care w/ sob and wheezing the last 3 days found to have hypercapnic respiratory failure 2/2 asthma exacerbation (peak flow 250) 2/2 running out of home Symbicort and URI. In the ED, patient placed on bipap and also given Hydralazine 10mg IVP x1, Labetalol 10mg IVP x1 and 20mg IVP x1 for hypertensive urgency to /142 likely 2/2 asthma exacerbation. Admitted to [facility] where he was weaned off bipap and placed on nc. Started solumedrol 60 for 5 days. Started Noravasc 5. Duonebs changed to standing. Peak flow 210-->240. Blood pressure improved to 160/89.  Pulm consulted for asthma exacerbation. Stable and ready for step down to [facility].       SHIVA JOSHI, 67y, Male  MRN-[id number]  Patient is a 67y old  Male who presents with a chief complaint of hypercapnic respiratory failure (28 Mar 2022 19:12)      OVERNIGHT EVENTS: Tele called for 30s of atrial fibrillation v MAT, self-resolved. EKG shows sinus tach. No fever, pt asymptomatic. Duoneb changed to Xopenex/ipratropium q8H standing.     SUBJECTIVE: Patient seen and examined at bedside. Reports feeling much better-improved breathing. Denies fevers, chills, HA, chest pain, sob, nausea, vomiting, abdominal pain, diarrhea, constipation, dysuria.     12 Point ROS Negative unless noted otherwise above.  -------------------------------------------------------------------------------  VITAL SIGNS:  Vital Signs Last 24 Hrs  T(C): 36.4 (29 Mar 2022 10:00), Max: 37.5 (28 Mar 2022 16:49)  T(F): 97.6 (29 Mar 2022 10:00), Max: 99.5 (28 Mar 2022 16:49)  HR: 82 (29 Mar 2022 10:40) (80 - 120)  BP: 150/76 (29 Mar 2022 10:40) (115/74 - 240/164)  BP(mean): 104 (29 Mar 2022 10:40) (90 - 148)  RR: 18 (29 Mar 2022 08:41) (18 - 28)  SpO2: 94% (29 Mar 2022 10:40) (94% - 100%)  I&O's Summary      PHYSICAL EXAM:    General: pleasant man in NAD, well developed  HEENT: NC/AT; EOMI, PERRLA, anicteric sclera; dry mucosal membranes.  Neck: supple, trachea midline  Cardiovascular: RRR, +S1/S2; NO M/R/G  Respiratory: poor inspiratory effort, +cough, no wheezing on exam   Gastrointestinal: soft, NT/ND; +BSx4  Extremities: WWP; no edema or cyanosis  Vascular: 2+ radial, DP/PT pulses B/L  Neurological: AAOx3; no focal deficits    ALLERGIES:  Allergies    No Known Allergies    Intolerances        MEDICATIONS:  MEDICATIONS  (STANDING):  allopurinol 300 milliGRAM(s) Oral every 24 hours  amLODIPine   Tablet 5 milliGRAM(s) Oral every 24 hours  brimonidine 0.2% Ophthalmic Solution 1 Drop(s) Both EYES three times a day  budesonide 160 MICROgram(s)/formoterol 4.5 MICROgram(s) Inhaler 2 Puff(s) Inhalation two times a day  colchicine 0.6 milliGRAM(s) Oral daily  dextrose 5%. 1000 milliLiter(s) (100 mL/Hr) IV Continuous <Continuous>  dextrose 5%. 1000 milliLiter(s) (50 mL/Hr) IV Continuous <Continuous>  dextrose 50% Injectable 25 Gram(s) IV Push once  dextrose 50% Injectable 12.5 Gram(s) IV Push once  dextrose 50% Injectable 25 Gram(s) IV Push once  glucagon  Injectable 1 milliGRAM(s) IntraMuscular once  insulin lispro (ADMELOG) corrective regimen sliding scale   SubCutaneous Before meals and at bedtime  ipratropium    for Nebulization 500 MICROGram(s) Nebulizer every 6 hours  latanoprost 0.005% Ophthalmic Solution 1 Drop(s) Both EYES at bedtime  levalbuterol Inhalation 0.63 milliGRAM(s) Inhalation every 6 hours  methylPREDNISolone sodium succinate Injectable 60 milliGRAM(s) IV Push every 24 hours  montelukast 10 milliGRAM(s) Oral daily  timolol 0.5% Solution 1 Drop(s) Both EYES two times a day    MEDICATIONS  (PRN):  acetaminophen     Tablet .. 650 milliGRAM(s) Oral every 6 hours PRN Temp greater or equal to 38C (100.4F), Mild Pain (1 - 3)  dextrose Oral Gel 15 Gram(s) Oral once PRN Blood Glucose LESS THAN 70 milliGRAM(s)/deciliter      -------------------------------------------------------------------------------  LABS:                        13.9   12.87 )-----------( 159      ( 29 Mar 2022 07:26 )             40.5     03-29    137  |  102  |  22  ----------------------------<  144<H>  4.0   |  22  |  1.00    Ca    9.0      29 Mar 2022 07:26  Phos  4.2     03-29  Mg     2.3     03-29    TPro  7.7  /  Alb  5.1<H>  /  TBili  0.9  /  DBili  x   /  AST  35  /  ALT  24  /  AlkPhos  139<H>  03-28    LIVER FUNCTIONS - ( 29 Mar 2022 07:26 )  Alb: x     / Pro: x     / ALK PHOS: x     / ALT: x     / AST: x     / GGT: 50 U/L       PT/INR - ( 28 Mar 2022 15:42 )   PT: 12.0 sec;   INR: 1.01          PTT - ( 28 Mar 2022 15:42 )  PTT:31.3 sec    CAPILLARY BLOOD GLUCOSE      POCT Blood Glucose.: 152 mg/dL (29 Mar 2022 06:32)      Culture - Blood (collected 28 Mar 2022 17:27)  Source: .Blood Blood-Peripheral  Preliminary Report (29 Mar 2022 06:00):    No growth at 12 hours    Culture - Blood (collected 28 Mar 2022 17:27)  Source: .Blood Blood-Peripheral  Preliminary Report (29 Mar 2022 06:00):    No growth at 12 hours          RADIOLOGY & ADDITIONAL TESTS: Reviewed.   Transfer Note from  to [facility]   Hospital Course: 68 yo M PMH HTN, HLD, gout, asthma (on Symbicort + montelukast - no prior flare or intubation), glaucoma BIBEMS from urgent care w/ sob and wheezing the last 3 days found to have hypercapnic respiratory failure 2/2 asthma exacerbation (peak flow 250) 2/2 running out of home Symbicort and URI. In the ED, patient placed on bipap and also given Hydralazine 10mg IVP x1, Labetalol 10mg IVP x1 and Labetalol 20mg IVP x1 for hypertensive urgency with /142 likely 2/2 asthma exacerbation. Admitted to [facility] where he was weaned off bipap and placed on nc. Started solumedrol 60 for 5 days. Started Noravasc 5. Duonebs changed to standing. Peak flow 210-->240. Blood pressure improved to 160/89.  Pulm consulted for asthma exacerbation. Stable and ready for step down to [facility].       SHIVA JOSHI, 67y, Male  MRN-[id number]  Patient is a 67y old  Male who presents with a chief complaint of hypercapnic respiratory failure (28 Mar 2022 19:12)      OVERNIGHT EVENTS: Tele called for 30s of atrial fibrillation v MAT, self-resolved. EKG shows sinus tach. No fever, pt asymptomatic. Duoneb changed to Xopenex/ipratropium q8H standing.     SUBJECTIVE: Patient seen and examined at bedside. Reports feeling much better-improved breathing. Denies fevers, chills, HA, chest pain, sob, nausea, vomiting, abdominal pain, diarrhea, constipation, dysuria.     12 Point ROS Negative unless noted otherwise above.  -------------------------------------------------------------------------------  VITAL SIGNS:  Vital Signs Last 24 Hrs  T(C): 36.4 (29 Mar 2022 10:00), Max: 37.5 (28 Mar 2022 16:49)  T(F): 97.6 (29 Mar 2022 10:00), Max: 99.5 (28 Mar 2022 16:49)  HR: 82 (29 Mar 2022 10:40) (80 - 120)  BP: 150/76 (29 Mar 2022 10:40) (115/74 - 240/164)  BP(mean): 104 (29 Mar 2022 10:40) (90 - 148)  RR: 18 (29 Mar 2022 08:41) (18 - 28)  SpO2: 94% (29 Mar 2022 10:40) (94% - 100%)  I&O's Summary      PHYSICAL EXAM:    General: pleasant man in NAD, well developed  HEENT: NC/AT; EOMI, PERRLA, anicteric sclera; dry mucosal membranes.  Neck: supple, trachea midline  Cardiovascular: RRR, +S1/S2; NO M/R/G  Respiratory: poor inspiratory effort, +cough, no wheezing on exam   Gastrointestinal: soft, NT/ND; +BSx4  Extremities: WWP; no edema or cyanosis  Vascular: 2+ radial, DP/PT pulses B/L  Neurological: AAOx3; no focal deficits    ALLERGIES:  Allergies    No Known Allergies    Intolerances        MEDICATIONS:  MEDICATIONS  (STANDING):  allopurinol 300 milliGRAM(s) Oral every 24 hours  amLODIPine   Tablet 5 milliGRAM(s) Oral every 24 hours  brimonidine 0.2% Ophthalmic Solution 1 Drop(s) Both EYES three times a day  budesonide 160 MICROgram(s)/formoterol 4.5 MICROgram(s) Inhaler 2 Puff(s) Inhalation two times a day  colchicine 0.6 milliGRAM(s) Oral daily  dextrose 5%. 1000 milliLiter(s) (100 mL/Hr) IV Continuous <Continuous>  dextrose 5%. 1000 milliLiter(s) (50 mL/Hr) IV Continuous <Continuous>  dextrose 50% Injectable 25 Gram(s) IV Push once  dextrose 50% Injectable 12.5 Gram(s) IV Push once  dextrose 50% Injectable 25 Gram(s) IV Push once  glucagon  Injectable 1 milliGRAM(s) IntraMuscular once  insulin lispro (ADMELOG) corrective regimen sliding scale   SubCutaneous Before meals and at bedtime  ipratropium    for Nebulization 500 MICROGram(s) Nebulizer every 6 hours  latanoprost 0.005% Ophthalmic Solution 1 Drop(s) Both EYES at bedtime  levalbuterol Inhalation 0.63 milliGRAM(s) Inhalation every 6 hours  methylPREDNISolone sodium succinate Injectable 60 milliGRAM(s) IV Push every 24 hours  montelukast 10 milliGRAM(s) Oral daily  timolol 0.5% Solution 1 Drop(s) Both EYES two times a day    MEDICATIONS  (PRN):  acetaminophen     Tablet .. 650 milliGRAM(s) Oral every 6 hours PRN Temp greater or equal to 38C (100.4F), Mild Pain (1 - 3)  dextrose Oral Gel 15 Gram(s) Oral once PRN Blood Glucose LESS THAN 70 milliGRAM(s)/deciliter      -------------------------------------------------------------------------------  LABS:                        13.9   12.87 )-----------( 159      ( 29 Mar 2022 07:26 )             40.5     03-29    137  |  102  |  22  ----------------------------<  144<H>  4.0   |  22  |  1.00    Ca    9.0      29 Mar 2022 07:26  Phos  4.2     03-29  Mg     2.3     03-29    TPro  7.7  /  Alb  5.1<H>  /  TBili  0.9  /  DBili  x   /  AST  35  /  ALT  24  /  AlkPhos  139<H>  03-28    LIVER FUNCTIONS - ( 29 Mar 2022 07:26 )  Alb: x     / Pro: x     / ALK PHOS: x     / ALT: x     / AST: x     / GGT: 50 U/L       PT/INR - ( 28 Mar 2022 15:42 )   PT: 12.0 sec;   INR: 1.01          PTT - ( 28 Mar 2022 15:42 )  PTT:31.3 sec    CAPILLARY BLOOD GLUCOSE      POCT Blood Glucose.: 152 mg/dL (29 Mar 2022 06:32)      Culture - Blood (collected 28 Mar 2022 17:27)  Source: .Blood Blood-Peripheral  Preliminary Report (29 Mar 2022 06:00):    No growth at 12 hours    Culture - Blood (collected 28 Mar 2022 17:27)  Source: .Blood Blood-Peripheral  Preliminary Report (29 Mar 2022 06:00):    No growth at 12 hours          RADIOLOGY & ADDITIONAL TESTS: Reviewed.

## 2022-03-29 NOTE — CONSULT NOTE ADULT - ASSESSMENT
68 yo M PMH HTN, HLD, gout, asthma (on Symbicort + montelukast - no prior flare or intubation), glaucoma BIBEMS from urgent care w/ sob and wheezing the last 3 days found to have hypercapnic respiratory failure 2/2 asthma exacerbation (peak flow 250) 2/2 running out of home Symbicort. Admitted to [facility] for further management.     #Acute respiratory failure with hypercapnia.   Hypercapnic respiratory failure 2/2 asthma flare 2/2 running out of home Symbicort combined w/ possible recent URI. S/p prednisone 60mg and duonebs (urgent care), solumedrol 60mg and 2mg mag and placed on bipap in transit to hospital. VBG PH 7.29, PCO2 58, HCO3 23 (no prior), flu/RSV/covid neg. CXR w/ hyperinflated lungs. Current Peak flow=250 (does not know his baseline). Currently breathing comfortably on nasal canula, w/ mild expiratory wheezing  -c/w solumedrol 60mg qd for 5 days for asthma flare  -c/w Moreno while on steroids and monitor fingersticks   -c/w home Symbicort 160/4.5 -2 puff/BID   -c/w home montelukast 10mg qd  -c/w PRN duoneb q4  -daily peakflow  -BiPAP at bedside if needed     #Uncontrolled hypertension.   Hx of HTN and was on amlodipine 2 years ago. BP was reportedly normal at urgent care, so may be acute rise. P/w 229/169 (s/p IV hydralazine 10mg, labetalol 30mg) --->156/96  - Pt hypertensive 200s/100s, asymptomatic -- given Enaliprat 1.25mg IVP x1   -start amlodipine 5mg and titrate up  -PRN hydralazine/ labetalol if persistently hypertensive.    #Calcified lymph nodes.   Calcified hilar and mediastinal lymph nodes + splenic calcification on CT: Incidental findings. Pt is covid vaccinated and never smoker. Says he traveled to [place] 40 years ago where he had illness only w/ diarrhea. Denies hemoptysis, fever, chills, cough, TB infection at that time. Differential is broad and includes hemangioma/lymphoma/sarcoid/prior infection w/ histo/TB, ect.  -obtain further collateral information from allergiest/immunologist   -pulm f/u on dc. 66 yo M PMH HTN, HLD, gout, asthma (on Symbicort + montelukast - no prior flare or intubation), glaucoma BIBEMS from urgent care w/ sob and wheezing the last 3 days found to have hypercapnic respiratory failure 2/2 asthma exacerbation (peak flow 250) 2/2 running out of home Symbicort. Admitted to [facility] for further management. After clinical improvement stepped down to [facility] to continue care.     #Asthma flare  #Acute respiratory failure with hypercapnia.     Hypercapnic respiratory failure 2/2 first asthma flare 2/2 running out of home Symbicort and pollen, cold and NSAIDs recent exposure combined w/ possible recent URI. S/p prednisone 60mg and duonebs (urgent care), solumedrol 60mg and 2mg mag and placed on bipap in transit to hospital. VBG PH 7.29, PCO2 58, HCO3 23 (no prior), flu/RSV/covid neg. CXR w/ hyperinflated lungs. Current Peak flow=250 (does not know his baseline). Currrently breathing comfortably on nasal canula, w/ no expiratory wheezing with significant clinical improvement. Now on 3L O2 NC.  -avoid pollen exposure with allergy management, NSAID use  -transition to prednisose 60mg QD PO for 5 days for asthma flare (through 4/2)  -reintroduce home Symbicort 160/4.5 -2 puff/BID   -c/w home montelukast 10mg QD  -c/w Duoneb q4h PRN  -daily peakflow  -c/w ISS while on steroids and monitor fingersticks   -wean O2 as tolerated  -follow up outpatient with Dr. Wilson     #Calcified lymph nodes.   Calcified hilar and mediastinal lymph nodes + splenic calcification on CT: Incidental findings. Pt is covid vaccinated and never smoker. Says he traveled to [place] 40 years ago where he had illness only w/ diarrhea. Denies hemoptysis, fever, chills, cough, TB infection at that time. Differential is broad and includes hemangioma/lymphoma/sarcoid/prior infection w/ histo/TB, ect.  -obtain further collateral information from allergiest/immunologist   -follow up outpatient with Dr. Wilson  66 yo M PMH HTN, HLD, gout, asthma (on Symbicort + montelukast - no prior flare or intubation), glaucoma BIBEMS from urgent care w/ sob and wheezing the last 3 days found to have hypercapnic respiratory failure 2/2 asthma exacerbation (peak flow 250) 2/2 running out of home Symbicort. Admitted [facility] for further management. After clinical improvement stepped down to [facility] to continue care.     #First episode of asthma flare  #Acute respiratory failure with hypercapnia  #Recurrent allergic sinusitis  #Allergy to pollen     Hypercapnic respiratory failure 2/2 first asthma flare 2/2 running out of home Symbicort and pollen, cold and NSAIDs recent exposure combined w/ possible recent URI. S/p prednisone 60mg and duonebs (urgent care), solumedrol 60mg and 2mg mag and placed on bipap in transit to hospital. VBG PH 7.29, PCO2 58, HCO3 23 (no prior), flu/RSV/covid neg. CXR w/ hyperinflated lungs. Current Peak flow=250 (does not know his baseline). Currently breathing comfortably on nasal canula, w/ no expiratory wheezing with significant clinical improvement. Now on 3L O2 NC.  -avoid pollen exposure with allergy management, NSAID use  -transition to prednisose 60mg QD PO for 5 days for asthma flare (through 4/2)  -can reintroduce home Symbicort 160/4.5 -2 puff/BID (given no funding by his insurance of Symbicort, another combination inhaled corticosteroids and LABA can be recommended, Breo Ellipta 200/25mcg 1 puff/QD)  -c/w home montelukast 10mg QD  -c/w Duoneb q4h PRN (while admitted in hospital), can transition to home albuterol 90mcg PRN (when discharged)  -daily peakflow and ISS while on steroids and monitor fingersticks while admitted    -wean O2 as tolerated  -follow up outpatient with Dr. Wilson     #Calcified lymph nodes.   Calcified hilar and mediastinal lymph nodes + splenic calcification on CT: Incidental findings. Pt is covid vaccinated and never smoker. Says he traveled to [place] 40 years ago where he had illness only w/ diarrhea. Denies hemoptysis, fever, chills, cough, TB infection at that time. Differential is broad and includes hemangioma/lymphoma/sarcoid/prior infection w/ histo/TB, ect.  -obtain further collateral information from allergiest/immunologist   -order QuantiFeron test  -follow up outpatient with Dr. Wilson  66 yo M PMH HTN, HLD, gout, asthma (on Symbicort + montelukast - no prior flare or intubation), glaucoma BIBEMS from urgent care w/ sob and wheezing the last 3 days found to have hypercapnic respiratory failure 2/2 asthma exacerbation (peak flow 250) 2/2 running out of home Symbicort. Admitted [facility] for further management. After clinical improvement stepped down to [facility] to continue care.     #First episode of asthma flare  #Acute respiratory failure  #Recurrent allergic sinusitis  #Allergy to pollen     Acute respiratory failure 2/2 first asthma flare in likely asthma/COPD 2/2 running out of home Symbicort and pollen, cold and NSAIDs recent exposure combined w/ possible recent URI. S/p prednisone 60mg and duonebs (urgent care), solumedrol 60mg and 2mg mag and placed on bipap in transit to hospital. VBG PH 7.29, PCO2 58, HCO3 23 (no prior), flu/RSV/covid neg. CXR w/ hyperinflated lungs. Current Peak flow=250 (does not know his baseline). Currently breathing comfortably on nasal canula, w/ no expiratory wheezing with significant clinical improvement. Now on 3L O2 NC.  -avoid pollen exposure with allergy management, NSAID use  -transition to prednisose 60mg QD PO for 5 days for asthma flare (through 4/2)  -can reintroduce home Symbicort 160/4.5 -2 puff/BID (given no funding by his insurance of Symbicort, another combination inhaled corticosteroids and LABA can be recommended, Breo Ellipta 200/25mcg 1 puff/QD)  -c/w home montelukast 10mg QD  -c/w Duoneb q4h PRN (while admitted in hospital), can transition to home albuterol 90mcg PRN (when discharged)  -daily peakflow and ISS while on steroids and monitor fingersticks while admitted    -wean O2 as tolerated  -follow up outpatient with Dr. Wilson and we sign off of this case    #Calcified lymph nodes.   Calcified hilar and mediastinal lymph nodes + splenic calcification on CT: Incidental findings. Pt is covid vaccinated and never smoker. Says he traveled to [place] 40 years ago where he had illness only w/ diarrhea. Denies hemoptysis, fever, chills, cough, TB infection at that time. Differential is broad and includes hemangioma/lymphoma/sarcoid/prior infection w/ histo/TB, ect.  -obtain further collateral information from allergiest/immunologist   -order QuantiFeron test  -follow up outpatient with Dr. Wilson  66 yo M PMH HTN, HLD, gout, asthma (on Symbicort + montelukast - no prior flare or intubation), glaucoma BIBEMS from urgent care w/ sob and wheezing the last 3 days found to have hypercapnic respiratory failure 2/2 asthma exacerbation (peak flow 250) 2/2 running out of home Symbicort. Admitted to [facility] for further management. After clinical improvement stepped down to [facility] to continue care.     #First episode of asthma flare  #Acute respiratory failure  #Recurrent allergic sinusitis  #Allergy to pollen     Acute respiratory failure 2/2 first asthma flare in likely asthma/COPD 2/2 running out of home Symbicort and pollen, cold and NSAIDs recent exposure combined w/ possible recent URI. S/p prednisone 60mg and duonebs (urgent care), solumedrol 60mg and 2mg mag and placed on bipap in transit to hospital. VBG PH 7.29, PCO2 58, HCO3 23 (no prior), flu/RSV/covid neg. CXR w/ hyperinflated lungs. Current Peak flow=250 (does not know his baseline). Currently breathing comfortably on nasal canula, w/ no expiratory wheezing with significant clinical improvement. Now on 3L O2 NC.  -avoid pollen exposure with allergy management, NSAID use  -transition to prednisose 40mg QD PO for 5 days for asthma flare (through 4/2)  -can send script for Breo 200/25 to his pharmacy and call for the price, if it is too expensive, can send script for Symbicort 160/4.5 to another pharmacy as it was too expensive at his [facility]   -c/w home montelukast 10mg QD  -c/w Duoneb q6h while admitted, can transition to home albuterol 90mcg PRN (when discharged)  -follow up with his PCP for referral to pulmonology or follow up with Dr. Wilson (please provide information for his clinics    Pulm will sign off. Please call back with any questions.    #Calcified lymph nodes.   Calcified hilar and mediastinal lymph nodes + splenic calcification on CT: Incidental findings. Pt is covid vaccinated and never smoker. Says he traveled to [place] 40 years ago where he had illness only w/ diarrhea. Denies hemoptysis, fever, chills, cough, TB infection at that time. Differential is broad and includes hemangioma/lymphoma/sarcoid/prior infection w/ histo/TB, ect.  -obtain further collateral information from allergiest/immunologist   -order QuantiFeron test  -follow up outpatient with Dr. Wilson  66 yo M PMH HTN, HLD, gout, asthma (on Symbicort + montelukast - no prior flare or intubation), glaucoma BIBEMS from urgent care w/ sob and wheezing the last 3 days found to have hypercapnic respiratory failure 2/2 asthma exacerbation (peak flow 250) 2/2 running out of home Symbicort. Admitted to [facility] for further management. After clinical improvement stepped down to [facility] to continue care.     #First episode of asthma flare  #Acute respiratory failure  #Recurrent allergic sinusitis  #Allergy to pollen     Acute respiratory failure 2/2 first asthma flare in likely asthma/COPD 2/2 running out of home Symbicort and pollen, cold and NSAIDs recent exposure combined w/ possible recent URI. S/p prednisone 60mg and duonebs (urgent care), solumedrol 60mg and 2mg mag and placed on bipap in transit to hospital. VBG PH 7.29, PCO2 58, HCO3 23 (no prior), flu/RSV/covid neg. CXR w/ hyperinflated lungs. Current Peak flow=250 (does not know his baseline). Currently breathing comfortably on nasal canula, w/ no expiratory wheezing with significant clinical improvement. Now on 3L O2 NC.  -avoid pollen exposure with allergy management, NSAID use  -transition to prednisose 40mg QD PO for 5 days for asthma flare (through 4/2)  -can send script for Breo 200/25 to his pharmacy and call for the price, if it is too expensive, can send script for Symbicort 160/4.5 to another pharmacy as it was [facility]   -c/w home montelukast 10mg QD  -c/w Duoneb q6h while admitted, can transition to home albuterol 90mcg PRN (when discharged)  -follow up with his PCP for referral to pulmonology or follow up with Dr. Wilson (please provide information for his clinics    #Calcified lymph nodes.   Calcified hilar and mediastinal lymph nodes + splenic calcification on CT: Incidental findings. Pt is covid vaccinated and never smoker. Says he traveled to [place] 40 years ago where he had illness only w/ diarrhea. Denies hemoptysis, fever, chills, cough, TB infection at that time. Differential is broad and includes hemangioma/lymphoma/sarcoid/prior infection w/ histo/TB, ect.  -obtain further collateral information from allergiest/immunologist   -follow up outpatient with Dr. Steve Hoffman will sign off. Please call back with any questions.

## 2022-03-29 NOTE — PROGRESS NOTE ADULT - ATTENDING COMMENTS
67M with PMH of HTN, HLD, asthma and glaucoma presenting with asthma exacerbation, requiring a temporary stay in the step down unit, now stable for transfer to [facility].    -increasing blood pressures noted in the hospital, started on amlodipine.  hypertension may be exacerbated by concurrent steroid use.    - continue with IV steroids - could be transitioned to PO, particularly given significant improvement of respiratory status.  Continue to check peak flows. Previously Declined (within the last year)

## 2022-03-29 NOTE — PROGRESS NOTE ADULT - PROBLEM SELECTOR PLAN 1
Hypercapnic respiratory failure 2/2 asthma flare 2/2 running out of home Symbicort and recent URI. S/p prednisone 60mg and duonebs in urgent care without improvement. EMS gave solumedrol 60mg and 2mg mag and placed on bipap in transit to hospital. VBG PH 7.29, PCO2 58, HCO3 23 (no prior), flu/RSV/covid neg. CXR w/ hyperinflated lungs. Current Peak flow=250 (does not know his baseline). Currently breathing comfortably on nasal canula. On exam, poor inspiratory effort with cough but no wheezing   -c/w solumedrol 60mg qd for 5 days for asthma flare  -c/w home Symbicort 160/4.5 -2 puff/BID   -c/w home montelukast 10mg qd  -c/w PRN duoneb q4  -daily peakflow  - BiPAP at bedside if needed   -c/w Moreno while on steroids and monitor fingersticks     #Leukocytosis  Likely reactive 2/2 i/s/o steroid usage  - continue to monitor Hypercapnic respiratory failure 2/2 asthma flare 2/2 running out of home Symbicort and recent URI. S/p prednisone 60mg and duonebs in urgent care without improvement. EMS gave solumedrol 60mg and 2mg mag and placed on bipap in transit to hospital. VBG PH 7.29, PCO2 58, HCO3 23 (no prior), flu/RSV/covid neg. CXR w/ hyperinflated lungs. Current Peak flow=250 (does not know his baseline). Currently breathing comfortably on nasal canula. On exam, poor inspiratory effort with cough but no wheezing   -c/w solumedrol 60mg qd for 5 days for asthma flare  -c/w home Symbicort 160/4.5 -2 puff/BID   -c/w home montelukast 10mg qd  -c/w PRN duoneb q4  -daily peakflow  -c/w Moreno while on steroids and monitor fingersticks     #Leukocytosis  Likely reactive 2/2 i/s/o steroid usage  - continue to monitor

## 2022-03-29 NOTE — PROGRESS NOTE ADULT - PROBLEM SELECTOR PLAN 6
Hx of glaucoma  -c/w home Alphagan TID, timolol BID, Latanoprost BID
Hx of glaucoma  -c/w home Alphagan TID, timolol BID, Latanoprost BID

## 2022-03-29 NOTE — PROGRESS NOTE ADULT - ATTENDING COMMENTS
No wheezing and comfortable on RA. Needs pulm evaluation and will need follow up. Continue BP control.

## 2022-03-29 NOTE — PROGRESS NOTE ADULT - PROBLEM SELECTOR PLAN 4
Calcified hilar and mediastinal lymph nodes + splenic calcification on CT: Incidental findings. Pt is covid vaccinated and never smoker. Says he traveled to [place] 40 years ago where he had illness only w/ diarrhea. Denies hemoptysis, fever, chills, cough, TB infection at that time. Differential is broad and includes hemangioma/lymphoma/sarcoid/prior infection w/ histo/TB, ect.  -obtain further collateral information from allergiest/immunologist   -pulm f/u on dc
Calcified hilar and mediastinal lymph nodes + splenic calcification on CT: Incidental findings. Pt is covid vaccinated and never smoker. Says he traveled to [place] 40 years ago where he had illness only w/ diarrhea. Denies hemoptysis, fever, chills, cough, TB infection at that time. Differential is broad and includes hemangioma/lymphoma/sarcoid/prior infection w/ histo/TB, ect.  -obtain further collateral information from allergiest/immunologist   -pulm f/u on dc

## 2022-03-29 NOTE — PROGRESS NOTE ADULT - PROBLEM SELECTOR PLAN 2
BP was reportedly normal at urgent care, so may be acute rise 2/2 asthma exacerbation. P/w 229/169 but asymptomatic. s/p IV hydralazine 10mg, labetalol 30mg--->156/96. Reports hx of HTN and was on amlodipine 2 years ago but stopped taking after having normal bp readings at home .   -start amlodipine 5mg and titrate up  -PRN hydralazine/ labetalol if persistently hypertensive
BP was reportedly normal at urgent care, so may be acute rise 2/2 asthma exacerbation. P/w 229/169 but asymptomatic. s/p IV hydralazine 10mg, labetalol 30mg--->156/96. Reports hx of HTN and was on amlodipine 2 years ago but stopped taking after having normal bp readings at home .   -start amlodipine 5mg and titrate up  -PRN hydralazine/ labetalol if persistently hypertensive

## 2022-03-29 NOTE — PROGRESS NOTE ADULT - ASSESSMENT
68 yo M PMH HTN, HLD, gout, asthma (on Symbicort + montelukast - no prior flare or intubation), glaucoma BIBEMS from urgent care w/ sob and wheezing the last 3 days found to have hypercapnic respiratory failure 2/2 asthma exacerbation (peak flow 250) 2/2 running out of home Symbicort and URI. Admitted [facility] for asthma exacerbation, weaned off bipap, now stable for step down to [facility]                  66 yo M PMH HTN, HLD, gout, asthma (on Symbicort + montelukast - no prior flare or intubation), glaucoma BIBEMS from urgent care w/ sob and wheezing the last 3 days found to have hypercapnic respiratory failure 2/2 asthma exacerbation (peak flow 250) 2/2 running out of home Symbicort and URI. Admitted [facility] for asthma exacerbation, weaned off bipap, now stable for step down to [facility]

## 2022-03-29 NOTE — PROGRESS NOTE ADULT - PROBLEM SELECTOR PLAN 7
FEN: replete mag>2 and K>4  Code status: FULL    Dispo: [facility]  Lazaro: No  Access/Lines: IV  Sepsis: No
FEN: replete mag>2 and K>4  Code status: FULL    Dispo: [facility]  [facility]: No  Access/Lines: IV  Sepsis: No

## 2022-03-29 NOTE — PROGRESS NOTE ADULT - SUBJECTIVE AND OBJECTIVE BOX
~~stepdown from [facility] to [facility]~~  Hospital Course:    yo M PMH HTN, HLD, gout, asthma (on Symbicort + montelukast - no prior flare or intubation), glaucoma BIBEMS from urgent care w/ sob and wheezing the last 3 days found to have hypercapnic respiratory failure 2/2 asthma exacerbation (peak flow 250) 2/2 running out of home Symbicort and URI. In the ED, patient placed on bipap and also given Hydralazine 10mg IVP x1, Labetalol 10mg IVP x1 and Labetalol 20mg IVP x1 for hypertensive urgency with /142 likely 2/2 asthma exacerbation. Admitted to [facility] where he was weaned off bipap and placed on nc. Started solumedrol 60 for 5 days. Started Noravasc 5. Duonebs changed to standing. Peak flow 210-->240. Blood pressure improved to 160/89.  Pulm consulted for asthma exacerbation. Stable and ready for step down to [facility].       INTERVAL HPI/OVERNIGHT EVENTS:  Patient was seen and examined at bedside. _____    VITAL SIGNS:  T(F): 97.6 (03-29-22 @ 10:00)  HR: 82 (03-29-22 @ 10:40)  BP: 150/76 (03-29-22 @ 10:40)  RR: 18 (03-29-22 @ 08:41)  SpO2: 94% (03-29-22 @ 10:40)  Wt(kg): --    PHYSICAL EXAM:  General: pleasant man in NAD, well developed  HEENT: NC/AT; EOMI, PERRLA, anicteric sclera; dry mucosal membranes.  Neck: supple, trachea midline  Cardiovascular: RRR, +S1/S2; NO M/R/G  Respiratory: poor inspiratory effort, +cough, no wheezing on exam   Gastrointestinal: soft, NT/ND; +BSx4  Extremities: WWP; no edema or cyanosis  Vascular: 2+ radial, DP/PT pulses B/L  Neurological: AAOx3; no focal deficits    MEDICATIONS  (STANDING):  allopurinol 300 milliGRAM(s) Oral every 24 hours  amLODIPine   Tablet 5 milliGRAM(s) Oral every 24 hours  brimonidine 0.2% Ophthalmic Solution 1 Drop(s) Both EYES three times a day  budesonide 160 MICROgram(s)/formoterol 4.5 MICROgram(s) Inhaler 2 Puff(s) Inhalation two times a day  colchicine 0.6 milliGRAM(s) Oral daily  dextrose 5%. 1000 milliLiter(s) (100 mL/Hr) IV Continuous <Continuous>  dextrose 5%. 1000 milliLiter(s) (50 mL/Hr) IV Continuous <Continuous>  dextrose 50% Injectable 25 Gram(s) IV Push once  dextrose 50% Injectable 12.5 Gram(s) IV Push once  dextrose 50% Injectable 25 Gram(s) IV Push once  glucagon  Injectable 1 milliGRAM(s) IntraMuscular once  insulin lispro (ADMELOG) corrective regimen sliding scale   SubCutaneous Before meals and at bedtime  ipratropium    for Nebulization 500 MICROGram(s) Nebulizer every 6 hours  latanoprost 0.005% Ophthalmic Solution 1 Drop(s) Both EYES at bedtime  levalbuterol Inhalation 0.63 milliGRAM(s) Inhalation every 6 hours  methylPREDNISolone sodium succinate Injectable 60 milliGRAM(s) IV Push every 24 hours  montelukast 10 milliGRAM(s) Oral daily  timolol 0.5% Solution 1 Drop(s) Both EYES two times a day    MEDICATIONS  (PRN):  acetaminophen     Tablet .. 650 milliGRAM(s) Oral every 6 hours PRN Temp greater or equal to 38C (100.4F), Mild Pain (1 - 3)  dextrose Oral Gel 15 Gram(s) Oral once PRN Blood Glucose LESS THAN 70 milliGRAM(s)/deciliter      Allergies    No Known Allergies    Intolerances        LABS:                        13.9   12.87 )-----------( 159      ( 29 Mar 2022 07:26 )             40.5     03-29    137  |  102  |  22  ----------------------------<  144<H>  4.0   |  22  |  1.00    Ca    9.0      29 Mar 2022 07:26  Phos  4.2     03-29  Mg     2.3     03-29    TPro  7.7  /  Alb  5.1<H>  /  TBili  0.9  /  DBili  x   /  AST  35  /  ALT  24  /  AlkPhos  139<H>  03-28    PT/INR - ( 28 Mar 2022 15:42 )   PT: 12.0 sec;   INR: 1.01          PTT - ( 28 Mar 2022 15:42 )  PTT:31.3 sec      RADIOLOGY & ADDITIONAL TESTS:  Reviewed ~~stepdown from [facility] to [facility]~~    Hospital Course:  68 yo M PMH HTN, HLD, gout, asthma (on Symbicort + montelukast - no prior flare or intubation), glaucoma BIBEMS from urgent care w/ sob and wheezing the last 3 days found to have hypercapnic respiratory failure 2/2 asthma exacerbation (peak flow 250) 2/2 running out of home Symbicort and URI. In the ED, patient placed on bipap and also given Hydralazine 10mg IVP x1, Labetalol 10mg IVP x1 and Labetalol 20mg IVP x1 for hypertensive urgency with /142 likely 2/2 asthma exacerbation. Admitted to [facility] where he was weaned off bipap and placed on nc. Started solumedrol 60 for 5 days. Started Noravasc 5. Duonebs changed to standing. Peak flow 210-->240. Blood pressure improved to 160/89.  Pulm consulted for asthma exacerbation. Stable and ready for step down to [facility].     INTERVAL HPI/OVERNIGHT EVENTS:  Patient was seen and examined at bedside.     VITAL SIGNS:  T(F): 97.6 (03-29-22 @ 10:00)  HR: 82 (03-29-22 @ 10:40)  BP: 150/76 (03-29-22 @ 10:40)  RR: 18 (03-29-22 @ 08:41)  SpO2: 94% (03-29-22 @ 10:40)  Wt(kg): --    PHYSICAL EXAM:  General: pleasant man in NAD, well developed  HEENT: NC/AT; EOMI, PERRLA, anicteric sclera; dry mucosal membranes.  Neck: supple, trachea midline  Cardiovascular: RRR, +S1/S2; NO M/R/G  Respiratory: poor inspiratory effort, +cough, no wheezing on exam   Gastrointestinal: soft, NT/ND; +BSx4  Extremities: WWP; no edema or cyanosis  Vascular: 2+ radial, DP/PT pulses B/L  Neurological: AAOx3; no focal deficits    MEDICATIONS  (STANDING):  allopurinol 300 milliGRAM(s) Oral every 24 hours  amLODIPine   Tablet 5 milliGRAM(s) Oral every 24 hours  brimonidine 0.2% Ophthalmic Solution 1 Drop(s) Both EYES three times a day  budesonide 160 MICROgram(s)/formoterol 4.5 MICROgram(s) Inhaler 2 Puff(s) Inhalation two times a day  colchicine 0.6 milliGRAM(s) Oral daily  dextrose 5%. 1000 milliLiter(s) (100 mL/Hr) IV Continuous <Continuous>  dextrose 5%. 1000 milliLiter(s) (50 mL/Hr) IV Continuous <Continuous>  dextrose 50% Injectable 25 Gram(s) IV Push once  dextrose 50% Injectable 12.5 Gram(s) IV Push once  dextrose 50% Injectable 25 Gram(s) IV Push once  glucagon  Injectable 1 milliGRAM(s) IntraMuscular once  insulin lispro (ADMELOG) corrective regimen sliding scale   SubCutaneous Before meals and at bedtime  ipratropium    for Nebulization 500 MICROGram(s) Nebulizer every 6 hours  latanoprost 0.005% Ophthalmic Solution 1 Drop(s) Both EYES at bedtime  levalbuterol Inhalation 0.63 milliGRAM(s) Inhalation every 6 hours  methylPREDNISolone sodium succinate Injectable 60 milliGRAM(s) IV Push every 24 hours  montelukast 10 milliGRAM(s) Oral daily  timolol 0.5% Solution 1 Drop(s) Both EYES two times a day    MEDICATIONS  (PRN):  acetaminophen     Tablet .. 650 milliGRAM(s) Oral every 6 hours PRN Temp greater or equal to 38C (100.4F), Mild Pain (1 - 3)  dextrose Oral Gel 15 Gram(s) Oral once PRN Blood Glucose LESS THAN 70 milliGRAM(s)/deciliter      Allergies    No Known Allergies    Intolerances        LABS:                        13.9   12.87 )-----------( 159      ( 29 Mar 2022 07:26 )             40.5     03-29    137  |  102  |  22  ----------------------------<  144<H>  4.0   |  22  |  1.00    Ca    9.0      29 Mar 2022 07:26  Phos  4.2     03-29  Mg     2.3     03-29    TPro  7.7  /  Alb  5.1<H>  /  TBili  0.9  /  DBili  x   /  AST  35  /  ALT  24  /  AlkPhos  139<H>  03-28    PT/INR - ( 28 Mar 2022 15:42 )   PT: 12.0 sec;   INR: 1.01          PTT - ( 28 Mar 2022 15:42 )  PTT:31.3 sec      RADIOLOGY & ADDITIONAL TESTS:  Reviewed ~~stepdown from [facility] to [facility]~~    Hospital Course:  66 yo M PMH HTN, HLD, gout, asthma (on Symbicort + montelukast - no prior flare or intubation), glaucoma BIBEMS from urgent care w/ sob and wheezing the last 3 days found to have hypercapnic respiratory failure 2/2 asthma exacerbation (peak flow 250) 2/2 running out of home Symbicort and URI. In the ED, patient placed on bipap and also given Hydralazine 10mg IVP x1, Labetalol 10mg IVP x1 and Labetalol 20mg IVP x1 for hypertensive urgency with /142 likely 2/2 asthma exacerbation. Admitted [facility] where he was weaned off bipap and placed on nc. Started solumedrol 60 for 5 days. Started Noravasc 5. Duonebs changed to standing. Peak flow 210-->240. Blood pressure improved to 160/89.  Pulm consulted for asthma exacerbation. Stable and ready for step down to [facility].     INTERVAL HPI/OVERNIGHT EVENTS:  Patient was seen and examined at bedside. Patient still coughing, says now with mild sputum production. Peak flow 225-250. Denies HA, fever, CP, palpitations, N/V/D/C.    VITAL SIGNS:  T(F): 97.6 (03-29-22 @ 10:00)  HR: 82 (03-29-22 @ 10:40)  BP: 150/76 (03-29-22 @ 10:40)  RR: 18 (03-29-22 @ 08:41)  SpO2: 94% (03-29-22 @ 10:40)  Wt(kg): --    PHYSICAL EXAM:  General: pleasant man in NAD, well developed  HEENT: NC/AT; EOMI, PERRLA, anicteric sclera; dry mucosal membranes.  Neck: supple, trachea midline  Cardiovascular: RRR, +S1/S2; NO M/R/G  Respiratory: poor inspiratory effort, +cough, no wheezing on exam   Gastrointestinal: soft, NT/ND; +BSx4  Extremities: WWP; no edema or cyanosis  Vascular: 2+ radial, DP/PT pulses B/L  Neurological: AAOx3; no focal deficits    MEDICATIONS  (STANDING):  allopurinol 300 milliGRAM(s) Oral every 24 hours  amLODIPine   Tablet 5 milliGRAM(s) Oral every 24 hours  brimonidine 0.2% Ophthalmic Solution 1 Drop(s) Both EYES three times a day  budesonide 160 MICROgram(s)/formoterol 4.5 MICROgram(s) Inhaler 2 Puff(s) Inhalation two times a day  colchicine 0.6 milliGRAM(s) Oral daily  dextrose 5%. 1000 milliLiter(s) (100 mL/Hr) IV Continuous <Continuous>  dextrose 5%. 1000 milliLiter(s) (50 mL/Hr) IV Continuous <Continuous>  dextrose 50% Injectable 25 Gram(s) IV Push once  dextrose 50% Injectable 12.5 Gram(s) IV Push once  dextrose 50% Injectable 25 Gram(s) IV Push once  glucagon  Injectable 1 milliGRAM(s) IntraMuscular once  insulin lispro (ADMELOG) corrective regimen sliding scale   SubCutaneous Before meals and at bedtime  ipratropium    for Nebulization 500 MICROGram(s) Nebulizer every 6 hours  latanoprost 0.005% Ophthalmic Solution 1 Drop(s) Both EYES at bedtime  levalbuterol Inhalation 0.63 milliGRAM(s) Inhalation every 6 hours  methylPREDNISolone sodium succinate Injectable 60 milliGRAM(s) IV Push every 24 hours  montelukast 10 milliGRAM(s) Oral daily  timolol 0.5% Solution 1 Drop(s) Both EYES two times a day    MEDICATIONS  (PRN):  acetaminophen     Tablet .. 650 milliGRAM(s) Oral every 6 hours PRN Temp greater or equal to 38C (100.4F), Mild Pain (1 - 3)  dextrose Oral Gel 15 Gram(s) Oral once PRN Blood Glucose LESS THAN 70 milliGRAM(s)/deciliter      Allergies    No Known Allergies    Intolerances        LABS:                        13.9   12.87 )-----------( 159      ( 29 Mar 2022 07:26 )             40.5     03-29    137  |  102  |  22  ----------------------------<  144<H>  4.0   |  22  |  1.00    Ca    9.0      29 Mar 2022 07:26  Phos  4.2     03-29  Mg     2.3     03-29    TPro  7.7  /  Alb  5.1<H>  /  TBili  0.9  /  DBili  x   /  AST  35  /  ALT  24  /  AlkPhos  139<H>  03-28    PT/INR - ( 28 Mar 2022 15:42 )   PT: 12.0 sec;   INR: 1.01          PTT - ( 28 Mar 2022 15:42 )  PTT:31.3 sec      RADIOLOGY & ADDITIONAL TESTS:  Reviewed

## 2022-03-29 NOTE — PROGRESS NOTE ADULT - PROBLEM SELECTOR PLAN 1
Hypercapnic respiratory failure 2/2 asthma flare 2/2 running out of home Symbicort and recent URI. S/p prednisone 60mg and duonebs in urgent care without improvement. EMS gave solumedrol 60mg and 2mg mag and placed on bipap in transit to hospital. VBG PH 7.29, PCO2 58, HCO3 23 (no prior), flu/RSV/covid neg. CXR w/ hyperinflated lungs. Current Peak flow=250 (does not know his baseline). Currently breathing comfortably on nasal canula. On exam, poor inspiratory effort with cough but no wheezing   -c/w solumedrol 60mg qd for 5 days for asthma flare  -c/w home Symbicort 160/4.5 -2 puff/BID   -c/w home montelukast 10mg qd  -c/w PRN duoneb q4  -daily peakflow  -c/w [place] while on steroids and monitor fingersticks   -f/u pulm recs  #Leukocytosis  Likely reactive 2/2 i/s/o steroid usage  - continue to monitor Hypercapnic respiratory failure 2/2 asthma flare 2/2 running out of home Symbicort and recent URI. S/p prednisone 60mg and duonebs in urgent care without improvement. EMS gave solumedrol 60mg and 2mg mag and placed on bipap in transit to hospital. VBG PH 7.29, PCO2 58, HCO3 23 (no prior), flu/RSV/covid neg. CXR w/ hyperinflated lungs. Current Peak flow=250 (does not know his baseline). Currently breathing comfortably on nasal canula. On exam, poor inspiratory effort with cough but no wheezing   -c/w solumedrol 60mg qd for 5 days for asthma flare (3/28-4/1)   -c/w home Symbicort 160/4.5 -2 puff/BID   -c/w home montelukast 10mg qd  -daily peakflow  -c/w Moreno while on steroids and monitor fingersticks   -f/u pulm recs    #Leukocytosis  Likely reactive 2/2 i/s/o steroid usage  - continue to monitor

## 2022-03-29 NOTE — CONSULT NOTE ADULT - ATTENDING COMMENTS
Patient seen and examined with house-staff during bedside rounds.  Resident note read, including vitals, physical findings, laboratory data, and radiological reports.   Revisions included below.  Direct personal management at bed side and extensive interpretation of the data.  Plan was outlined and discussed in details with the housestaff.  Decision making of high complexity  Action taken for acute disease activity to reflect the level of care provided:  - medication reconciliation  - review laboratory data    The patient was seen and examined emergency room.  He was weaned off the BiPAP after he improved his peak flow was 200.  Continue bronchodilator steroids.  Will observe with a monitored bed.
Asthma exacerbation with allergic rhinitis with non compliance (not using Symbicort due to coverage issue with inhaler). CXR, labs and notes were reviewed. Rest as above.

## 2022-03-29 NOTE — PROGRESS NOTE ADULT - ASSESSMENT
66 yo M PMH HTN, HLD, gout, asthma (on Symbicort + montelukast - no prior flare or intubation), glaucoma BIBEMS from urgent care w/ sob and wheezing the last 3 days found to have hypercapnic respiratory failure 2/2 asthma exacerbation (peak flow 250) 2/2 running out of home Symbicort and URI. Admitted [facility] for asthma exacerbation, weaned off bipap, now stable for step down to [facility]

## 2022-03-29 NOTE — CONSULT NOTE ADULT - SUBJECTIVE AND OBJECTIVE BOX
***Note in progress***    HHOSPITAL COURSE: 66 yo M PMH HTN, HLD, gout, asthma (on Symbicort + montelukast - no prior flare or intubation), glaucoma BIBEMS from urgent care w/ sob and wheezing the last 3 days. Pt says he had sob and wheezing the last 3 days that got really bad today where he had difficulty breathing. He says he had allergies w/ painful sinuses 7 days ago. Admits to lots of sinus/nasal secretions at that time. Denies fever, chills, productive cough sore throat. Pt also says he has not been taking his Symbicort as he ran out the last 3 weeks. Pt says he is having insurance issues and was not able to pay for the medication as it was too expensive. Pt initially went to urgent care where he was given prednisone 60mg and duonebs w/ no improvement. EMS gave pt solumedrol 60mg and 2mg mag and placed on bipap in transit to hospital. Pt says he also had some low back pain that is feeling better now. Says he is covid vaccinated, and never smoker. Drinks 10oz vodka/week, no hx of alcohol withdrawal. Says he follows w/ a allergist Dr. Michael Phillips, but does not have a regular PCP. Says he had BP problems in the past and was on amlodipine and statin, however, stopped taking as home BP checks was normal. Stopped taking statin 2/2 myalgias. Pt not aware of CT findings of calcifications in spleen and lymph nodes, says he traveled to [place] 40 years ago where he had illness only w/ diarrhea. Denies hemoptysis, fever, chills, cough, TB infection at that time.     OVERNIGHT EVENTS: NAEO    SUBJECTIVE / INTERVAL HPI: Patient seen and examined at bedside.     Remaining ROS negative     PHYSICAL EXAM:  Constitutional: sitting up in bed, on 4L NC   Eyes: PERRL, EOMI, clear conjunctiva  ENT: no nasal discharge; no oropharyngeal erythema or exudates; MMM  Neck: supple; no JVD   Respiratory: audible wheezing in upper lung fields bilaterally   Cardiac: +S1/S2; RRR; no M/R/G  Gastrointestinal: soft, NT/ND; no rebound or guarding; +BSx4  Extremities: WWP, no clubbing or cyanosis; no peripheral edema  Musculoskeletal: NROM x4; no joint swelling, tenderness or erythema  Vascular: 2+ radial, femoral, DP/PT pulses B/L  Dermatologic: skin warm, dry and intact; no rashes, wounds, or scars  Neurologic: AAOx3; CNII-XII grossly intact; no focal deficits  Psychiatric: affect and characteristics of appearance, verbalizations, behaviors are appropriate    VITAL SIGNS:  Vital Signs Last 24 Hrs  T(C): 36.4 (29 Mar 2022 10:00), Max: 37.5 (28 Mar 2022 16:49)  T(F): 97.6 (29 Mar 2022 10:00), Max: 99.5 (28 Mar 2022 16:49)  HR: 80 (29 Mar 2022 08:41) (80 - 120)  BP: 115/74 (29 Mar 2022 08:41) (115/74 - 240/164)  BP(mean): 90 (29 Mar 2022 08:41) (90 - 148)  RR: 18 (29 Mar 2022 08:41) (18 - 28)  SpO2: 96% (29 Mar 2022 08:41) (95% - 100%)    MEDICATIONS:  MEDICATIONS  (STANDING):  allopurinol 300 milliGRAM(s) Oral every 24 hours  amLODIPine   Tablet 5 milliGRAM(s) Oral every 24 hours  brimonidine 0.2% Ophthalmic Solution 1 Drop(s) Both EYES three times a day  budesonide 160 MICROgram(s)/formoterol 4.5 MICROgram(s) Inhaler 2 Puff(s) Inhalation two times a day  colchicine 0.6 milliGRAM(s) Oral daily  dextrose 5%. 1000 milliLiter(s) (100 mL/Hr) IV Continuous <Continuous>  dextrose 5%. 1000 milliLiter(s) (50 mL/Hr) IV Continuous <Continuous>  dextrose 50% Injectable 25 Gram(s) IV Push once  dextrose 50% Injectable 12.5 Gram(s) IV Push once  dextrose 50% Injectable 25 Gram(s) IV Push once  glucagon  Injectable 1 milliGRAM(s) IntraMuscular once  insulin lispro (ADMELOG) corrective regimen sliding scale   SubCutaneous Before meals and at bedtime  ipratropium    for Nebulization 500 MICROGram(s) Nebulizer every 6 hours  latanoprost 0.005% Ophthalmic Solution 1 Drop(s) Both EYES at bedtime  levalbuterol Inhalation 0.63 milliGRAM(s) Inhalation every 6 hours  methylPREDNISolone sodium succinate Injectable 60 milliGRAM(s) IV Push every 24 hours  montelukast 10 milliGRAM(s) Oral daily  timolol 0.5% Solution 1 Drop(s) Both EYES two times a day    MEDICATIONS  (PRN):  acetaminophen     Tablet .. 650 milliGRAM(s) Oral every 6 hours PRN Temp greater or equal to 38C (100.4F), Mild Pain (1 - 3)  dextrose Oral Gel 15 Gram(s) Oral once PRN Blood Glucose LESS THAN 70 milliGRAM(s)/deciliter    ALLERGIES:  No Known Allergies    LABS:                        13.9   12.87 )-----------( 159      ( 29 Mar 2022 07:26 )             40.5     03-29    137  |  102  |  22  ----------------------------<  144<H>  4.0   |  22  |  1.00    Ca    9.0      29 Mar 2022 07:26  Phos  4.2     03-29  Mg     2.3     03-29    TPro  7.7  /  Alb  5.1<H>  /  TBili  0.9  /  DBili  x   /  AST  35  /  ALT  24  /  AlkPhos  139<H>  03-28    PT/INR - ( 28 Mar 2022 15:42 )   PT: 12.0 sec;   INR: 1.01       PTT - ( 28 Mar 2022 15:42 )  PTT:31.3 sec    CAPILLARY BLOOD GLUCOSE  POCT Blood Glucose.: 152 mg/dL (29 Mar 2022 06:32)    RADIOLOGY & ADDITIONAL TESTS: Reviewed. ***Note in progress***    HHOSPITAL COURSE: 68 yo M PMH HTN, HLD, gout (on colchicine and allopurinol), asthma (diagnosed on 1980s, on yearly or biyearly PFTs, on albuterol, Symbicort (but suspended 1 month ago due to no funding by his insurance) and montelukast - no prior flare, hospitalization or intubation), recurrent allergic sinusitis (on Sudafed and Xyzal), bilateral glaucoma (on eye drops: timolol, brimonidine and latanoprost, surgical glaucoma), BIBEMS from urgent care w/ SOB and wheezing the last 3 days. Pt says he had sob and wheezing the last 3 days that got really bad today where he had difficulty breathing and greenish productive cough. He says he had allergies to polen w/ painful sinuses 7 days ago. Admits to lots of sinus/nasal secretions at that time using NSAID (naproxen 2 times) and acetaminophen. Denies fever, chills, sore throat. Pt also says he has not been taking his Symbicort as he ran out the last month. Pt says he is having insurance issues and was not able to pay for the medication as it was too expensive. Pt initially went to urgent care where he was given prednisone 60mg and duonebs w/ no improvement. EMS gave pt solumedrol 60mg and 2mg mag and placed on BIPAP in transit to hospital. Pt says he also had some low back pain that is feeling better now. Says he is covid vaccinated, and never smoker. Drinks 10oz vodka/week, no hx of alcohol withdrawal. Says he follows w/ a allergist Dr. Michael Phillips, but does not have a regular PCP. Says he had BP problems in the past and was on amlodipine and statin, however, stopped taking as home BP checks was normal. Stopped taking statin 2/2 myalgias. Pt not aware of CT findings of calcifications in spleen and lymph nodes, says he traveled to [place] 40 years ago where he had illness only w/ diarrhea. Denies hemoptysis, fever, chills, cough, TB infection at that time.     OVERNIGHT EVENTS: NAEO    SUBJECTIVE / INTERVAL HPI: Patient seen and examined at bedside.     Remaining ROS negative     PHYSICAL EXAM:  Constitutional: sitting up in bed, on 3L NC   Eyes: PERRL, EOMI, clear conjunctiva  ENT: no nasal discharge; no oropharyngeal erythema or exudates; MMM  Neck: supple; no JVD   Respiratory: no audible wheezing in upper lung fields bilaterally, dry cough with inspiration, no problem to finish sentences while in bed, acceptable breath sounds   Cardiac: +S1/S2; RRR; no M/R/G  Gastrointestinal: soft, NT/ND; no rebound or guarding; +BSx4  Extremities: WWP, no clubbing or cyanosis; no peripheral edema  Musculoskeletal: NROM x4; no joint swelling, tenderness or erythema  Vascular: 2+ radial, femoral, DP/PT pulses B/L  Dermatologic: skin warm, dry and intact; no rashes, wounds, or scars  Neurologic: AAOx3; CNII-XII grossly intact; no focal deficits  Psychiatric: affect and characteristics of appearance, verbalizations, behaviors are appropriate    VITAL SIGNS:  Vital Signs Last 24 Hrs  T(C): 36.4 (29 Mar 2022 10:00), Max: 37.5 (28 Mar 2022 16:49)  T(F): 97.6 (29 Mar 2022 10:00), Max: 99.5 (28 Mar 2022 16:49)  HR: 80 (29 Mar 2022 08:41) (80 - 120)  BP: 115/74 (29 Mar 2022 08:41) (115/74 - 240/164)  BP(mean): 90 (29 Mar 2022 08:41) (90 - 148)  RR: 18 (29 Mar 2022 08:41) (18 - 28)  SpO2: 96% (29 Mar 2022 08:41) (95% - 100%)    MEDICATIONS:  MEDICATIONS  (STANDING):  allopurinol 300 milliGRAM(s) Oral every 24 hours  amLODIPine   Tablet 5 milliGRAM(s) Oral every 24 hours  brimonidine 0.2% Ophthalmic Solution 1 Drop(s) Both EYES three times a day  budesonide 160 MICROgram(s)/formoterol 4.5 MICROgram(s) Inhaler 2 Puff(s) Inhalation two times a day  colchicine 0.6 milliGRAM(s) Oral daily  dextrose 5%. 1000 milliLiter(s) (100 mL/Hr) IV Continuous <Continuous>  dextrose 5%. 1000 milliLiter(s) (50 mL/Hr) IV Continuous <Continuous>  dextrose 50% Injectable 25 Gram(s) IV Push once  dextrose 50% Injectable 12.5 Gram(s) IV Push once  dextrose 50% Injectable 25 Gram(s) IV Push once  glucagon  Injectable 1 milliGRAM(s) IntraMuscular once  insulin lispro (ADMELOG) corrective regimen sliding scale   SubCutaneous Before meals and at bedtime  ipratropium    for Nebulization 500 MICROGram(s) Nebulizer every 6 hours  latanoprost 0.005% Ophthalmic Solution 1 Drop(s) Both EYES at bedtime  levalbuterol Inhalation 0.63 milliGRAM(s) Inhalation every 6 hours  methylPREDNISolone sodium succinate Injectable 60 milliGRAM(s) IV Push every 24 hours  montelukast 10 milliGRAM(s) Oral daily  timolol 0.5% Solution 1 Drop(s) Both EYES two times a day    MEDICATIONS  (PRN):  acetaminophen     Tablet .. 650 milliGRAM(s) Oral every 6 hours PRN Temp greater or equal to 38C (100.4F), Mild Pain (1 - 3)  dextrose Oral Gel 15 Gram(s) Oral once PRN Blood Glucose LESS THAN 70 milliGRAM(s)/deciliter    ALLERGIES:  No Known Allergies    LABS:                        13.9   12.87 )-----------( 159      ( 29 Mar 2022 07:26 )             40.5     03-29    137  |  102  |  22  ----------------------------<  144<H>  4.0   |  22  |  1.00    Ca    9.0      29 Mar 2022 07:26  Phos  4.2     03-29  Mg     2.3     03-29    TPro  7.7  /  Alb  5.1<H>  /  TBili  0.9  /  DBili  x   /  AST  35  /  ALT  24  /  AlkPhos  139<H>  03-28    PT/INR - ( 28 Mar 2022 15:42 )   PT: 12.0 sec;   INR: 1.01       PTT - ( 28 Mar 2022 15:42 )  PTT:31.3 sec    CAPILLARY BLOOD GLUCOSE  POCT Blood Glucose.: 152 mg/dL (29 Mar 2022 06:32)    RADIOLOGY & ADDITIONAL TESTS: Reviewed. HOSPITAL COURSE: 68 yo M PMH HTN, HLD, gout (on colchicine and allopurinol), asthma (diagnosed on 1980s, on yearly or biyearly PFTs, on albuterol, Symbicort (but suspended 1 month ago due to no funding by his insurance) and montelukast - no prior flare, hospitalization or intubation), recurrent allergic sinusitis (on Sudafed and Xyzal), bilateral glaucoma (on eye drops: timolol, brimonidine and latanoprost, surgical glaucoma), BIBEMS from urgent care w/ SOB and wheezing the last 3 days. Pt says he had sob and wheezing the last 3 days that got really bad today where he had difficulty breathing and greenish productive cough. He says he had allergies to polen w/ painful sinuses 7 days ago. Admits to lots of sinus/nasal secretions at that time using NSAID (naproxen 2 times) and acetaminophen. Denies fever, chills, sore throat. Pt also says he has not been taking his Symbicort as he ran out the last month. Pt says he is having insurance issues and was not able to pay for the medication as it was too expensive. Pt initially went to urgent care where he was given prednisone 60mg and duonebs w/ no improvement. EMS gave pt solumedrol 60mg and 2mg mag and placed on BIPAP in transit to hospital. Pt says he also had some low back pain that is feeling better now. Says he is covid vaccinated, and never smoker. Drinks 10oz vodka/week, no hx of alcohol withdrawal. Says he follows w/ a allergist Dr. Michael Phillips, but does not have a regular PCP. Says he had BP problems in the past and was on amlodipine and statin, however, stopped taking as home BP checks was normal. Stopped taking statin 2/2 myalgias. Pt not aware of CT findings of calcifications in spleen and lymph nodes, says he traveled to [place] 40 years ago where he had illness only w/ diarrhea. Denies hemoptysis, fever, chills, cough, TB infection at that time.     OVERNIGHT EVENTS: NAEO    SUBJECTIVE / INTERVAL HPI: Patient seen and examined at bedside.     Remaining ROS negative     PHYSICAL EXAM:  Constitutional: sitting up in bed, on 3L NC   Eyes: PERRL, EOMI, clear conjunctiva  ENT: no nasal discharge; no oropharyngeal erythema or exudates; MMM  Neck: supple; no JVD   Respiratory: no audible wheezing in upper lung fields bilaterally, dry cough with inspiration, no problem to finish sentences while in bed, acceptable breath sounds   Cardiac: +S1/S2; RRR; no M/R/G  Gastrointestinal: soft, NT/ND; no rebound or guarding; +BSx4  Extremities: WWP, no clubbing or cyanosis; no peripheral edema  Musculoskeletal: NROM x4; no joint swelling, tenderness or erythema  Vascular: 2+ radial, femoral, DP/PT pulses B/L  Dermatologic: skin warm, dry and intact; no rashes, wounds, or scars  Neurologic: AAOx3; CNII-XII grossly intact; no focal deficits  Psychiatric: affect and characteristics of appearance, verbalizations, behaviors are appropriate    VITAL SIGNS:  Vital Signs Last 24 Hrs  T(C): 36.4 (29 Mar 2022 10:00), Max: 37.5 (28 Mar 2022 16:49)  T(F): 97.6 (29 Mar 2022 10:00), Max: 99.5 (28 Mar 2022 16:49)  HR: 80 (29 Mar 2022 08:41) (80 - 120)  BP: 115/74 (29 Mar 2022 08:41) (115/74 - 240/164)  BP(mean): 90 (29 Mar 2022 08:41) (90 - 148)  RR: 18 (29 Mar 2022 08:41) (18 - 28)  SpO2: 96% (29 Mar 2022 08:41) (95% - 100%)    MEDICATIONS:  MEDICATIONS  (STANDING):  allopurinol 300 milliGRAM(s) Oral every 24 hours  amLODIPine   Tablet 5 milliGRAM(s) Oral every 24 hours  brimonidine 0.2% Ophthalmic Solution 1 Drop(s) Both EYES three times a day  budesonide 160 MICROgram(s)/formoterol 4.5 MICROgram(s) Inhaler 2 Puff(s) Inhalation two times a day  colchicine 0.6 milliGRAM(s) Oral daily  dextrose 5%. 1000 milliLiter(s) (100 mL/Hr) IV Continuous <Continuous>  dextrose 5%. 1000 milliLiter(s) (50 mL/Hr) IV Continuous <Continuous>  dextrose 50% Injectable 25 Gram(s) IV Push once  dextrose 50% Injectable 12.5 Gram(s) IV Push once  dextrose 50% Injectable 25 Gram(s) IV Push once  glucagon  Injectable 1 milliGRAM(s) IntraMuscular once  insulin lispro (ADMELOG) corrective regimen sliding scale   SubCutaneous Before meals and at bedtime  ipratropium    for Nebulization 500 MICROGram(s) Nebulizer every 6 hours  latanoprost 0.005% Ophthalmic Solution 1 Drop(s) Both EYES at bedtime  levalbuterol Inhalation 0.63 milliGRAM(s) Inhalation every 6 hours  methylPREDNISolone sodium succinate Injectable 60 milliGRAM(s) IV Push every 24 hours  montelukast 10 milliGRAM(s) Oral daily  timolol 0.5% Solution 1 Drop(s) Both EYES two times a day    MEDICATIONS  (PRN):  acetaminophen     Tablet .. 650 milliGRAM(s) Oral every 6 hours PRN Temp greater or equal to 38C (100.4F), Mild Pain (1 - 3)  dextrose Oral Gel 15 Gram(s) Oral once PRN Blood Glucose LESS THAN 70 milliGRAM(s)/deciliter    ALLERGIES:  No Known Allergies    LABS:                        13.9   12.87 )-----------( 159      ( 29 Mar 2022 07:26 )             40.5     03-29    137  |  102  |  22  ----------------------------<  144<H>  4.0   |  22  |  1.00    Ca    9.0      29 Mar 2022 07:26  Phos  4.2     03-29  Mg     2.3     03-29    TPro  7.7  /  Alb  5.1<H>  /  TBili  0.9  /  DBili  x   /  AST  35  /  ALT  24  /  AlkPhos  139<H>  03-28    PT/INR - ( 28 Mar 2022 15:42 )   PT: 12.0 sec;   INR: 1.01       PTT - ( 28 Mar 2022 15:42 )  PTT:31.3 sec    CAPILLARY BLOOD GLUCOSE  POCT Blood Glucose.: 152 mg/dL (29 Mar 2022 06:32)    RADIOLOGY & ADDITIONAL TESTS: Reviewed.

## 2022-03-30 ENCOUNTER — TRANSCRIPTION ENCOUNTER (OUTPATIENT)
Age: 67
End: 2022-03-30

## 2022-03-30 VITALS
RESPIRATION RATE: 19 BRPM | TEMPERATURE: 98 F | DIASTOLIC BLOOD PRESSURE: 92 MMHG | SYSTOLIC BLOOD PRESSURE: 167 MMHG | OXYGEN SATURATION: 94 % | HEART RATE: 82 BPM

## 2022-03-30 PROBLEM — M10.9 GOUT, UNSPECIFIED: Chronic | Status: ACTIVE | Noted: 2022-03-28

## 2022-03-30 PROBLEM — H40.9 UNSPECIFIED GLAUCOMA: Chronic | Status: ACTIVE | Noted: 2022-03-28

## 2022-03-30 PROBLEM — J45.909 UNSPECIFIED ASTHMA, UNCOMPLICATED: Chronic | Status: ACTIVE | Noted: 2022-03-28

## 2022-03-30 LAB
ALBUMIN SERPL ELPH-MCNC: 4 G/DL — SIGNIFICANT CHANGE UP (ref 3.3–5)
ALP SERPL-CCNC: 103 U/L — SIGNIFICANT CHANGE UP (ref 40–120)
ALT FLD-CCNC: 23 U/L — SIGNIFICANT CHANGE UP (ref 10–45)
ANION GAP SERPL CALC-SCNC: 9 MMOL/L — SIGNIFICANT CHANGE UP (ref 5–17)
ANISOCYTOSIS BLD QL: SLIGHT — SIGNIFICANT CHANGE UP
AST SERPL-CCNC: 28 U/L — SIGNIFICANT CHANGE UP (ref 10–40)
BASOPHILS # BLD AUTO: 0 K/UL — SIGNIFICANT CHANGE UP (ref 0–0.2)
BASOPHILS NFR BLD AUTO: 0 % — SIGNIFICANT CHANGE UP (ref 0–2)
BILIRUB SERPL-MCNC: 0.6 MG/DL — SIGNIFICANT CHANGE UP (ref 0.2–1.2)
BUN SERPL-MCNC: 32 MG/DL — HIGH (ref 7–23)
CALCIUM SERPL-MCNC: 9.2 MG/DL — SIGNIFICANT CHANGE UP (ref 8.4–10.5)
CHLORIDE SERPL-SCNC: 102 MMOL/L — SIGNIFICANT CHANGE UP (ref 96–108)
CO2 SERPL-SCNC: 26 MMOL/L — SIGNIFICANT CHANGE UP (ref 22–31)
CREAT SERPL-MCNC: 1.14 MG/DL — SIGNIFICANT CHANGE UP (ref 0.5–1.3)
EGFR: 71 ML/MIN/1.73M2 — SIGNIFICANT CHANGE UP
EOSINOPHIL # BLD AUTO: 0 K/UL — SIGNIFICANT CHANGE UP (ref 0–0.5)
EOSINOPHIL NFR BLD AUTO: 0 % — SIGNIFICANT CHANGE UP (ref 0–6)
GIANT PLATELETS BLD QL SMEAR: PRESENT — SIGNIFICANT CHANGE UP
GLUCOSE BLDC GLUCOMTR-MCNC: 112 MG/DL — HIGH (ref 70–99)
GLUCOSE BLDC GLUCOMTR-MCNC: 116 MG/DL — HIGH (ref 70–99)
GLUCOSE SERPL-MCNC: 105 MG/DL — HIGH (ref 70–99)
HCT VFR BLD CALC: 40.4 % — SIGNIFICANT CHANGE UP (ref 39–50)
HGB BLD-MCNC: 13.6 G/DL — SIGNIFICANT CHANGE UP (ref 13–17)
LYMPHOCYTES # BLD AUTO: 47.4 % — HIGH (ref 13–44)
LYMPHOCYTES # BLD AUTO: 7.78 K/UL — HIGH (ref 1–3.3)
MACROCYTES BLD QL: SLIGHT — SIGNIFICANT CHANGE UP
MAGNESIUM SERPL-MCNC: 2.4 MG/DL — SIGNIFICANT CHANGE UP (ref 1.6–2.6)
MANUAL SMEAR VERIFICATION: SIGNIFICANT CHANGE UP
MCHC RBC-ENTMCNC: 33.7 GM/DL — SIGNIFICANT CHANGE UP (ref 32–36)
MCHC RBC-ENTMCNC: 33.7 PG — SIGNIFICANT CHANGE UP (ref 27–34)
MCV RBC AUTO: 100.2 FL — HIGH (ref 80–100)
MONOCYTES # BLD AUTO: 1.71 K/UL — HIGH (ref 0–0.9)
MONOCYTES NFR BLD AUTO: 10.4 % — SIGNIFICANT CHANGE UP (ref 2–14)
NEUTROPHILS # BLD AUTO: 6.93 K/UL — SIGNIFICANT CHANGE UP (ref 1.8–7.4)
NEUTROPHILS NFR BLD AUTO: 42.2 % — LOW (ref 43–77)
PHOSPHATE SERPL-MCNC: 3.4 MG/DL — SIGNIFICANT CHANGE UP (ref 2.5–4.5)
PLAT MORPH BLD: ABNORMAL
PLATELET # BLD AUTO: 135 K/UL — LOW (ref 150–400)
POIKILOCYTOSIS BLD QL AUTO: SLIGHT — SIGNIFICANT CHANGE UP
POLYCHROMASIA BLD QL SMEAR: SLIGHT — SIGNIFICANT CHANGE UP
POTASSIUM SERPL-MCNC: 3.8 MMOL/L — SIGNIFICANT CHANGE UP (ref 3.5–5.3)
POTASSIUM SERPL-SCNC: 3.8 MMOL/L — SIGNIFICANT CHANGE UP (ref 3.5–5.3)
PROT SERPL-MCNC: 6.3 G/DL — SIGNIFICANT CHANGE UP (ref 6–8.3)
RBC # BLD: 4.03 M/UL — LOW (ref 4.2–5.8)
RBC # FLD: 12.9 % — SIGNIFICANT CHANGE UP (ref 10.3–14.5)
RBC BLD AUTO: ABNORMAL
SODIUM SERPL-SCNC: 137 MMOL/L — SIGNIFICANT CHANGE UP (ref 135–145)
SPHEROCYTES BLD QL SMEAR: SLIGHT — SIGNIFICANT CHANGE UP
WBC # BLD: 16.42 K/UL — HIGH (ref 3.8–10.5)
WBC # FLD AUTO: 16.42 K/UL — HIGH (ref 3.8–10.5)

## 2022-03-30 PROCEDURE — 83880 ASSAY OF NATRIURETIC PEPTIDE: CPT

## 2022-03-30 PROCEDURE — 82728 ASSAY OF FERRITIN: CPT

## 2022-03-30 PROCEDURE — 84145 PROCALCITONIN (PCT): CPT

## 2022-03-30 PROCEDURE — 82977 ASSAY OF GGT: CPT

## 2022-03-30 PROCEDURE — 85730 THROMBOPLASTIN TIME PARTIAL: CPT

## 2022-03-30 PROCEDURE — 84132 ASSAY OF SERUM POTASSIUM: CPT

## 2022-03-30 PROCEDURE — 80048 BASIC METABOLIC PNL TOTAL CA: CPT

## 2022-03-30 PROCEDURE — 94640 AIRWAY INHALATION TREATMENT: CPT

## 2022-03-30 PROCEDURE — 84100 ASSAY OF PHOSPHORUS: CPT

## 2022-03-30 PROCEDURE — 71275 CT ANGIOGRAPHY CHEST: CPT | Mod: MA

## 2022-03-30 PROCEDURE — 85610 PROTHROMBIN TIME: CPT

## 2022-03-30 PROCEDURE — 93005 ELECTROCARDIOGRAM TRACING: CPT

## 2022-03-30 PROCEDURE — 96375 TX/PRO/DX INJ NEW DRUG ADDON: CPT

## 2022-03-30 PROCEDURE — 74174 CTA ABD&PLVS W/CONTRAST: CPT | Mod: MA

## 2022-03-30 PROCEDURE — 87637 SARSCOV2&INF A&B&RSV AMP PRB: CPT

## 2022-03-30 PROCEDURE — 87040 BLOOD CULTURE FOR BACTERIA: CPT

## 2022-03-30 PROCEDURE — 82330 ASSAY OF CALCIUM: CPT

## 2022-03-30 PROCEDURE — 83036 HEMOGLOBIN GLYCOSYLATED A1C: CPT

## 2022-03-30 PROCEDURE — 84295 ASSAY OF SERUM SODIUM: CPT

## 2022-03-30 PROCEDURE — 86140 C-REACTIVE PROTEIN: CPT

## 2022-03-30 PROCEDURE — 99292 CRITICAL CARE ADDL 30 MIN: CPT

## 2022-03-30 PROCEDURE — 71045 X-RAY EXAM CHEST 1 VIEW: CPT

## 2022-03-30 PROCEDURE — 99239 HOSP IP/OBS DSCHRG MGMT >30: CPT

## 2022-03-30 PROCEDURE — 86803 HEPATITIS C AB TEST: CPT

## 2022-03-30 PROCEDURE — 83735 ASSAY OF MAGNESIUM: CPT

## 2022-03-30 PROCEDURE — 99291 CRITICAL CARE FIRST HOUR: CPT

## 2022-03-30 PROCEDURE — 36415 COLL VENOUS BLD VENIPUNCTURE: CPT

## 2022-03-30 PROCEDURE — 85025 COMPLETE CBC W/AUTO DIFF WBC: CPT

## 2022-03-30 PROCEDURE — 82962 GLUCOSE BLOOD TEST: CPT

## 2022-03-30 PROCEDURE — 84484 ASSAY OF TROPONIN QUANT: CPT

## 2022-03-30 PROCEDURE — 80061 LIPID PANEL: CPT

## 2022-03-30 PROCEDURE — 80053 COMPREHEN METABOLIC PANEL: CPT

## 2022-03-30 PROCEDURE — 96374 THER/PROPH/DIAG INJ IV PUSH: CPT

## 2022-03-30 PROCEDURE — 82803 BLOOD GASES ANY COMBINATION: CPT

## 2022-03-30 RX ORDER — BUDESONIDE AND FORMOTEROL FUMARATE DIHYDRATE 160; 4.5 UG/1; UG/1
2 AEROSOL RESPIRATORY (INHALATION)
Qty: 120 | Refills: 0
Start: 2022-03-30 | End: 2022-04-28

## 2022-03-30 RX ORDER — FLUTICASONE FUROATE AND VILANTEROL TRIFENATATE 100; 25 UG/1; UG/1
1 POWDER RESPIRATORY (INHALATION)
Qty: 120 | Refills: 0
Start: 2022-03-30 | End: 2022-04-28

## 2022-03-30 RX ORDER — AMLODIPINE BESYLATE 2.5 MG/1
1 TABLET ORAL
Qty: 30 | Refills: 0
Start: 2022-03-30 | End: 2022-04-28

## 2022-03-30 RX ORDER — FLUTICASONE PROPIONATE AND SALMETEROL 50; 250 UG/1; UG/1
2 POWDER ORAL; RESPIRATORY (INHALATION)
Qty: 120 | Refills: 0
Start: 2022-03-30 | End: 2022-04-28

## 2022-03-30 RX ORDER — BUDESONIDE AND FORMOTEROL FUMARATE DIHYDRATE 160; 4.5 UG/1; UG/1
2 AEROSOL RESPIRATORY (INHALATION)
Qty: 0 | Refills: 0 | DISCHARGE

## 2022-03-30 RX ADMIN — LATANOPROST 1 DROP(S): 0.05 SOLUTION/ DROPS OPHTHALMIC; TOPICAL at 00:29

## 2022-03-30 RX ADMIN — MONTELUKAST 10 MILLIGRAM(S): 4 TABLET, CHEWABLE ORAL at 12:00

## 2022-03-30 RX ADMIN — LEVALBUTEROL 0.63 MILLIGRAM(S): 1.25 SOLUTION, CONCENTRATE RESPIRATORY (INHALATION) at 12:01

## 2022-03-30 RX ADMIN — BRIMONIDINE TARTRATE 1 DROP(S): 2 SOLUTION/ DROPS OPHTHALMIC at 05:49

## 2022-03-30 RX ADMIN — BUDESONIDE AND FORMOTEROL FUMARATE DIHYDRATE 2 PUFF(S): 160; 4.5 AEROSOL RESPIRATORY (INHALATION) at 05:49

## 2022-03-30 RX ADMIN — Medication 300 MILLIGRAM(S): at 00:27

## 2022-03-30 RX ADMIN — Medication 40 MILLIGRAM(S): at 12:00

## 2022-03-30 RX ADMIN — Medication 500 MICROGRAM(S): at 12:01

## 2022-03-30 RX ADMIN — Medication 0.6 MILLIGRAM(S): at 12:00

## 2022-03-30 NOTE — DISCHARGE NOTE PROVIDER - NSDCMRMEDTOKEN_GEN_ALL_CORE_FT
Advair  mcg-21 mcg/inh inhalation aerosol: 2 puff(s) inhaled 2 times a day   allopurinol 300 mg oral tablet: 1 tab(s) orally once a day  Breo Ellipta 100 mcg-25 mcg/inh inhalation powder: 1 puff(s) inhaled once a day   brimonidine 0.2% ophthalmic solution: 1 drop(s) to each affected eye 3 times a day  colchicine 0.6 mg oral tablet: 1 tab(s) orally once a day  latanoprost 0.005% ophthalmic solution: 1 drop(s) to each affected eye once a day (in the evening)  montelukast 10 mg oral tablet: 1 tab(s) orally once a day  Symbicort 160 mcg-4.5 mcg/inh inhalation aerosol: 2 puff(s) inhaled 2 times a day  Symbicort 160 mcg-4.5 mcg/inh inhalation aerosol: 2 puff(s) inhaled 2 times a day   timolol hemihydrate 0.5% ophthalmic solution: 1 drop(s) to each affected eye 2 times a day   Advair  mcg-21 mcg/inh inhalation aerosol: 2 puff(s) inhaled 2 times a day   allopurinol 300 mg oral tablet: 1 tab(s) orally once a day  amLODIPine 5 mg oral tablet: 1 tab(s) orally every 24 hours  brimonidine 0.2% ophthalmic solution: 1 drop(s) to each affected eye 3 times a day  colchicine 0.6 mg oral tablet: 1 tab(s) orally once a day  latanoprost 0.005% ophthalmic solution: 1 drop(s) to each affected eye once a day (in the evening)  montelukast 10 mg oral tablet: 1 tab(s) orally once a day  predniSONE 20 mg oral tablet: 2 tab(s) orally every 24 hours  timolol hemihydrate 0.5% ophthalmic solution: 1 drop(s) to each affected eye 2 times a day

## 2022-03-30 NOTE — DISCHARGE NOTE PROVIDER - HOSPITAL COURSE
#Discharge: DO NOT DELETE    HOSPITAL COURSE:    66 yo M with PMHx HTN, HLD, gout, asthma (no hx intubation), glaucoma BIBEMS to [facility] on 3/28 with SOB/wheezing found to have acute hypercapnic respiratory failure 2/2 asthma exacerbation in the setting of medication non-adherence. Patient initially admitted to [facility] now off BiPAP, stable on RA, stepped down to RMF. While on RMF, patient remained on RA, ambulating without SOB/wheezing and satting 93% on ambulation. Scripts generated for outpatient inhalers and pulm follow-up made. Patient deemed stable for discharge home with close follow-up.     Problem List/Main Diagnoses:    #Asthma exacerbation  Admitted for asthma exacerbation 2/2 medication running out. Initially on BiPAP for 1 day on telemetry, weaned down to RA and stable. Initial peak flow 225.   - Pulm consulted while admitted  - Pulm outpatient F/U appointment made  - Scripts generated for inhalers on discharge    #HTN  - C/w current home medication regimen  - PCP outpatient F/U at [facility] (referral generated)    #HLD  - C/w current home medication regimen  - PCP outpatient F/U at [facility] (referral generated)    New Medications:

## 2022-03-30 NOTE — DISCHARGE NOTE PROVIDER - NSFOLLOWUPCLINICS_GEN_ALL_ED_FT
[facility]  Family Medicine  [address]  [place], [place] [address]  Phone: [phone]  Fax:

## 2022-03-30 NOTE — DISCHARGE NOTE PROVIDER - NSDCCPCAREPLAN_GEN_ALL_CORE_FT
PRINCIPAL DISCHARGE DIAGNOSIS  Diagnosis: Acute asthma exacerbation  Assessment and Plan of Treatment: Asthma is a condition that can make it hard to breathe. Asthma does not always cause symptoms. But when symptoms occur, they can be scary. Asthma attacks happen when the airways in the lungs become narrow and inflamed. Asthma symptoms can include wheezing or noisy breathing, coughing, a tight feeling in the chest and shortness of breath. You can help prevent your asthma symptoms by staying away from things that may cause your symptoms or make them worse. Some common triggers include dust, mold, pets, pollen, cigarette smoke, getting sick with a cold or flu and stress. It is important that you take your asthma medications as they are prescribe to prevent further exacerbations and help with symptoms during an asthma attack. You should see your doctor if you have an asthma attack and the symptoms do not improve or get worse after using a quick-relief medicine. If the symptoms are severe, call for an ambulance.  Please follow-up with Dr. Yair Wilson (pulmonologist) on discharge. An appointment has been made for you. In addition, you have prescriptions made for your inhalers at your pharmacy to .         SECONDARY DISCHARGE DIAGNOSES  Diagnosis: HTN (hypertension)  Assessment and Plan of Treatment: Hypertension is the medical term for high blood pressure. Blood pressure refers to the pressure that blood applies to the inner walls of the arteries. Arteries carry blood from the heart to other organs and parts of the body. Untreated high blood pressure increases the strain on the heart and arteries, eventually causing organ damage. High blood pressure increases the risk of heart failure, heart attack (myocardial infarction), stroke, and kidney failure. High blood pressure does not usually cause any symptoms. Treatment of hypertension usually begins with lifestyle changes. Making these lifestyle changes involves little or no risk. Recommended changes often include reducing the amount of salt in your diet, losing weight if you are overweight or obese, avoiding drinking too much alcohol, stopping smoking and exercising at least 30 minutes per day most days of the week. If you are prescribed medication for your hypertension it is important to take these as prescribed to prevent the possible complications of uncontrolled hypertension.  We will be generating a referral for [facility] clinic who will reach out to you with an appointment.        PRINCIPAL DISCHARGE DIAGNOSIS  Diagnosis: Acute asthma exacerbation  Assessment and Plan of Treatment: Asthma is a condition that can make it hard to breathe. Asthma does not always cause symptoms. But when symptoms occur, they can be scary. Asthma attacks happen when the airways in the lungs become narrow and inflamed. Asthma symptoms can include wheezing or noisy breathing, coughing, a tight feeling in the chest and shortness of breath. You can help prevent your asthma symptoms by staying away from things that may cause your symptoms or make them worse. Some common triggers include dust, mold, pets, pollen, cigarette smoke, getting sick with a cold or flu and stress. It is important that you take your asthma medications as they are prescribe to prevent further exacerbations and help with symptoms during an asthma attack. You should see your doctor if you have an asthma attack and the symptoms do not improve or get worse after using a quick-relief medicine. If the symptoms are severe, call for an ambulance.  You were started on a new inhaler called advair, it will be available for [facility] at your pharmacy. You also were started on a steroid called prednisone while you were here, you will need to complete a 5 day course of steroids, meaning you have two more days (3/31-4/1).   Please follow-up with Dr. Yair Wilson (pulmonologist) on discharge. An appointment has been made for you for next friday. Please call his office if you need to change the appointment      SECONDARY DISCHARGE DIAGNOSES  Diagnosis: HTN (hypertension)  Assessment and Plan of Treatment: Hypertension is the medical term for high blood pressure. Blood pressure refers to the pressure that blood applies to the inner walls of the arteries. Arteries carry blood from the heart to other organs and parts of the body. Untreated high blood pressure increases the strain on the heart and arteries, eventually causing organ damage. High blood pressure increases the risk of heart failure, heart attack (myocardial infarction), stroke, and kidney failure. High blood pressure does not usually cause any symptoms. Treatment of hypertension usually begins with lifestyle changes. Making these lifestyle changes involves little or no risk. Recommended changes often include reducing the amount of salt in your diet, losing weight if you are overweight or obese, avoiding drinking too much alcohol, stopping smoking and exercising at least 30 minutes per day most days of the week. If you are prescribed medication for your hypertension it is important to take these as prescribed to prevent the possible complications of uncontrolled hypertension.  You were started on a medication called amlodipine, you should take this daily, a prescription was sent to the pharmacy  We will be generating a referral for [facility] who will reach out to you with an appointment.

## 2022-03-30 NOTE — DISCHARGE NOTE NURSING/CASE MANAGEMENT/SOCIAL WORK - PATIENT PORTAL LINK FT
You can access the FollowMyHealth Patient Portal offered by [facility] by registering at the following website: [email or web address]. By joining [employer]’s FollowMyHealth portal, you will also be able to view your health information using other applications (apps) compatible with our system.

## 2022-03-30 NOTE — DISCHARGE NOTE PROVIDER - PROVIDER TOKENS
FREE:[LAST:[siegel],FIRST:[zahraa],PHONE:[phone]],FAX:[phone]   [phone]    -],ADDRESS:[[address] [place] [address]],SCHEDULEDAPPT:[04/08/2022],SCHEDULEDAPPTTIME:[10:30 AM]]

## 2022-03-30 NOTE — DISCHARGE NOTE PROVIDER - CARE PROVIDER_API CALL
zahraa siegel  [address] [place] [address]  Phone: [phone]  Fax: (   )    -  Scheduled Appointment: 04/08/2022 10:30 AM

## 2022-03-30 NOTE — DISCHARGE NOTE NURSING/CASE MANAGEMENT/SOCIAL WORK - NSDCPEFALRISK_GEN_ALL_CORE
For information on Fall & Injury Prevention, visit: [email or web address] OR  [email or web address] OR  https://www.cdc.gov/steadi/patient.html

## 2022-04-01 DIAGNOSIS — I16.0 HYPERTENSIVE URGENCY: ICD-10-CM

## 2022-04-01 DIAGNOSIS — D72.829 ELEVATED WHITE BLOOD CELL COUNT, UNSPECIFIED: ICD-10-CM

## 2022-04-01 DIAGNOSIS — T38.0X5A ADVERSE EFFECT OF GLUCOCORTICOIDS AND SYNTHETIC ANALOGUES, INITIAL ENCOUNTER: ICD-10-CM

## 2022-04-01 DIAGNOSIS — R74.8 ABNORMAL LEVELS OF OTHER SERUM ENZYMES: ICD-10-CM

## 2022-04-01 DIAGNOSIS — J45.901 UNSPECIFIED ASTHMA WITH (ACUTE) EXACERBATION: ICD-10-CM

## 2022-04-01 DIAGNOSIS — I10 ESSENTIAL (PRIMARY) HYPERTENSION: ICD-10-CM

## 2022-04-01 DIAGNOSIS — M10.9 GOUT, UNSPECIFIED: ICD-10-CM

## 2022-04-01 DIAGNOSIS — J96.02 ACUTE RESPIRATORY FAILURE WITH HYPERCAPNIA: ICD-10-CM

## 2022-04-01 DIAGNOSIS — I89.8 OTHER SPECIFIED NONINFECTIVE DISORDERS OF LYMPHATIC VESSELS AND LYMPH NODES: ICD-10-CM

## 2022-04-01 DIAGNOSIS — Z91.14 PATIENT'S OTHER NONCOMPLIANCE WITH MEDICATION REGIMEN: ICD-10-CM

## 2022-04-01 DIAGNOSIS — J06.9 ACUTE UPPER RESPIRATORY INFECTION, UNSPECIFIED: ICD-10-CM

## 2022-04-01 DIAGNOSIS — E78.5 HYPERLIPIDEMIA, UNSPECIFIED: ICD-10-CM

## 2022-04-02 LAB
CULTURE RESULTS: SIGNIFICANT CHANGE UP
CULTURE RESULTS: SIGNIFICANT CHANGE UP
SPECIMEN SOURCE: SIGNIFICANT CHANGE UP
SPECIMEN SOURCE: SIGNIFICANT CHANGE UP

## 2022-04-08 ENCOUNTER — APPOINTMENT (OUTPATIENT)
Dept: PULMONOLOGY | Facility: CLINIC | Age: 67
End: 2022-04-08
Payer: MEDICARE

## 2022-04-08 VITALS
OXYGEN SATURATION: 98 % | HEART RATE: 81 BPM | WEIGHT: 193 LBS | BODY MASS INDEX: 26.14 KG/M2 | SYSTOLIC BLOOD PRESSURE: 154 MMHG | HEIGHT: 72 IN | RESPIRATION RATE: 12 BRPM | TEMPERATURE: 97.8 F | DIASTOLIC BLOOD PRESSURE: 113 MMHG

## 2022-04-08 DIAGNOSIS — Z82.49 FAMILY HISTORY OF ISCHEMIC HEART DISEASE AND OTHER DISEASES OF THE CIRCULATORY SYSTEM: ICD-10-CM

## 2022-04-08 DIAGNOSIS — H40.9 UNSPECIFIED GLAUCOMA: ICD-10-CM

## 2022-04-08 PROCEDURE — 99214 OFFICE O/P EST MOD 30 MIN: CPT

## 2022-04-08 RX ORDER — MONTELUKAST SODIUM 10 MG/1
10 TABLET, FILM COATED ORAL
Refills: 0 | Status: ACTIVE | COMMUNITY

## 2022-04-08 RX ORDER — FLUTICASONE PROPIONATE 50 UG/1
50 SPRAY, METERED NASAL
Refills: 0 | Status: ACTIVE | COMMUNITY

## 2022-04-08 NOTE — DISCUSSION/SUMMARY
[FreeTextEntry1] : 66 yo M with PMHx HTN, HLD, gout, asthma (no hx intubation), glaucoma BIBEMS to [facility] on 3/28/22 with SOB/wheezing found to have acute hypercapnic respiratory failure 2/2 asthma exacerbation in the setting of medication non-adherence (insurance did not cover Symbicort).\par \par Review:\par -Hospital records\par - Labs\par - CXR: No pneumonia\par \par A/P\par Asthma:\par - Symbicort not covered by insurance\par - Adavir HFA dose increase to 230/21\par - Use spacer\par - Prn albuterol\par - Plan for PFT during next visit

## 2022-04-08 NOTE — REVIEW OF SYSTEMS
[Fever] : no fever [Chills] : no chills [Dry Eyes] : no dry eyes [Eye Irritation] : no eye irritation [Chest Tightness] : chest tightness [Cough] : no cough [Sputum] : no sputum [Chest Discomfort] : no chest discomfort [Orthopnea] : no orthopnea [Hay Fever] : no hay fever [Nasal Discharge] : no nasal discharge [GERD] : no gerd [Diarrhea] : no diarrhea [Nocturia] : no nocturia [Arthralgias] : no arthralgias [Trauma/ Injury] : no trauma/ injury

## 2022-04-08 NOTE — HISTORY OF PRESENT ILLNESS
[Never] : never [TextBox_4] : 66 yo M with PMHx HTN, HLD, gout, asthma (no hx intubation), glaucoma BIBEMS to [facility] on 3/28 with SOB/wheezing found to have acute hypercapnic respiratory failure 2/2 asthma exacerbation in the setting of medication non-adherence (insurance did not cover Symbicort).\par Patient initially admitted to [facility] now off BiPAP, stable on RA, stepped down to RMF. While on RMF, patient remained on RA, ambulating without SOB/wheezing and satting 93% on ambulation.\par \par Today, patient came to clinic for follow up. Patient is feeling better. He is using Advair HFA without use of any albuterol. He feels better but still feels some tightness in his chest.  [ESS] : 3

## 2022-04-08 NOTE — PHYSICAL EXAM
[No Acute Distress] : no acute distress [Well Nourished] : well nourished [Normal Oropharynx] : normal oropharynx [II] : Mallampati Class: II [Normal Appearance] : normal appearance [No Neck Mass] : no neck mass [Normal Rate/Rhythm] : normal rate/rhythm [Normal S1, S2] : normal s1, s2 [No Resp Distress] : no resp distress [Clear to Auscultation Bilaterally] : clear to auscultation bilaterally [Benign] : benign [Not Tender] : not tender [Normal Gait] : normal gait [No Clubbing] : no clubbing [Normal Color/ Pigmentation] : normal color/ pigmentation [No Edema] : no edema [No Rash] : no rash [No Focal Deficits] : no focal deficits [No Sensory Deficits] : no sensory deficits [Oriented x3] : oriented x3 [Normal Affect] : normal affect

## 2022-04-19 ENCOUNTER — APPOINTMENT (OUTPATIENT)
Age: 67
End: 2022-04-19

## 2022-04-24 ENCOUNTER — NON-APPOINTMENT (OUTPATIENT)
Age: 67
End: 2022-04-24

## 2022-04-28 ENCOUNTER — TRANSCRIPTION ENCOUNTER (OUTPATIENT)
Age: 67
End: 2022-04-28

## 2022-05-04 ENCOUNTER — NON-APPOINTMENT (OUTPATIENT)
Age: 67
End: 2022-05-04

## 2022-05-05 ENCOUNTER — APPOINTMENT (OUTPATIENT)
Dept: INTERNAL MEDICINE | Facility: CLINIC | Age: 67
End: 2022-05-05
Payer: MEDICARE

## 2022-05-05 VITALS
SYSTOLIC BLOOD PRESSURE: 157 MMHG | HEIGHT: 72 IN | HEART RATE: 67 BPM | BODY MASS INDEX: 27.09 KG/M2 | RESPIRATION RATE: 14 BRPM | WEIGHT: 200 LBS | OXYGEN SATURATION: 98 % | DIASTOLIC BLOOD PRESSURE: 114 MMHG

## 2022-05-05 DIAGNOSIS — E78.5 HYPERLIPIDEMIA, UNSPECIFIED: ICD-10-CM

## 2022-05-05 DIAGNOSIS — M1A.0510 IDIOPATHIC CHRONIC GOUT, RIGHT HIP, W/OUT TOPHUS (TOPHI): ICD-10-CM

## 2022-05-05 DIAGNOSIS — Z00.00 ENCOUNTER FOR GENERAL ADULT MEDICAL EXAMINATION W/OUT ABNORMAL FINDINGS: ICD-10-CM

## 2022-05-05 DIAGNOSIS — M10.9 GOUT, UNSPECIFIED: ICD-10-CM

## 2022-05-05 DIAGNOSIS — Z12.11 ENCOUNTER FOR SCREENING FOR MALIGNANT NEOPLASM OF COLON: ICD-10-CM

## 2022-05-05 DIAGNOSIS — I10 ESSENTIAL (PRIMARY) HYPERTENSION: ICD-10-CM

## 2022-05-05 PROCEDURE — G0439: CPT

## 2022-05-05 RX ORDER — ALLOPURINOL 300 MG/1
300 TABLET ORAL
Refills: 0 | Status: ACTIVE | COMMUNITY

## 2022-05-05 RX ORDER — COLCHICINE 0.6 MG/1
0.6 TABLET ORAL
Refills: 0 | Status: ACTIVE | COMMUNITY

## 2022-05-05 RX ORDER — AMLODIPINE BESYLATE 5 MG/1
5 TABLET ORAL
Refills: 0 | Status: ACTIVE | COMMUNITY

## 2022-05-05 RX ORDER — ALBUTEROL 90 MCG
90 AEROSOL (GRAM) INHALATION
Refills: 0 | Status: ACTIVE | COMMUNITY

## 2022-05-05 NOTE — ASSESSMENT
[FreeTextEntry1] : 67M PMH Asthma (Dx 30 years ago), seasonal allergies, Gout, HTN, HLD, and glaucoma presents to establish care s/p recent hospitalization at [facility] (3/28-3/30) for Acute Hypercapnic/ Hypoxic  Respiratory failure 2/2 Asthma exacerbation in the setting of missed meds (Symbicort not covered by insurance). \par \par #Asthma. Pt f/up w/ Dr. Wilson since dc from [facility] w/ plan for PFT on 5/11. Reports has been doing ok since dc from [facility] except for occasional congestion on rainy days. Home meds: Singulair 10, Advair 230/21, Albuterol 90 PRN \par -c/w home meds\par -f/u w/ Dr. Wilson \par \par # Gout. Home meds: Allopurinol 300 and Colchicine 0.6 daily. F/up w/ Rheum and requesting a referral to new provider as his current provider not accepting his new  insurance. \par -c/w home meds\par -f/u BMP \par -Rheum referral provided \par \par #HTN.  /114 in office today but reprots BP 120s-130s/80s at home. Have not taken BP meds yet. Home med: Amlodipine 5 (started on dc from [facility] 3/28) \par -f/u BMP\par -c/w home med\par -keep BP log and bring home BP cuff during f/uo visit in 2-3 weeks \par -f/up visit in 2-3 weeks for BP monitoring and med adjustment \par \par #HLD. ASCVD risk 15%. Reports being on Rosuvastatin in the past but dc'ed 2/2 s/e.  on 3/28/ 22 labs\par -want to c/w LSM (diet. exercise) for now \par \par HCM \par -due for c-scope, referral provided \par -UTD on COVID and PNA vaccines \par \par \par RTC in 2 wks for BP monitoring \par \par \par

## 2022-05-05 NOTE — HEALTH RISK ASSESSMENT
[Good] : ~his/her~  mood as  good [Never] : Never [Yes] : Yes [4 or more  times a week (4 pts)] : 4 or more  times a week (4 points) [1 or 2 (0 pts)] : 1 or 2 (0 points) [Less than monthly (1 pt)] : Less than monthly (1 point) [0] : 2) Feeling down, depressed, or hopeless: Not at all (0) [None] : None [Fully functional (bathing, dressing, toileting, transferring, walking, feeding)] : Fully functional (bathing, dressing, toileting, transferring, walking, feeding) [Fully functional (using the telephone, shopping, preparing meals, housekeeping, doing laundry, using] : Fully functional and needs no help or supervision to perform IADLs (using the telephone, shopping, preparing meals, housekeeping, doing laundry, using transportation, managing medications and managing finances) [Reports normal functional visual acuity (ie: able to read med bottle)] : Reports normal functional visual acuity [de-identified] : Pulm, Rheum  [Reports changes in hearing] : Reports no changes in hearing [Reports changes in vision] : Reports no changes in vision

## 2022-05-05 NOTE — HISTORY OF PRESENT ILLNESS
[FreeTextEntry1] : establish care  [de-identified] : 67M PMH Asthma (Dx 30 years ago), seasonal allergies, Gout, HTN, HLD, and glaucoma presents to establish care s/p recent hospitalization at [facility] (3/28-3/30) for Acute Hypercapnic/ Hypoxic  Respiratory failure 2/2 Asthma exacerbation in the setting of missed meds (Symbicort not covered by insurance). Pt f/up w/ Dr. Wilson since dc from [facility] w/ plan for PFT on 5/11. Reports has been doing ok since dc from [facility] except for occasional congestion on rainy days. Pt f/up w/ Rheum for gout and asking for referral to new provider as his current provider not accepting his new  insurance. Reports drinking 15oz of vodka almost daily for years, denies hx of withdrawals requiring hospitalizations and is currently working on cutting down. /114 in office today but reprots BP 120s-130s/80s at home. ROS otherwise negative

## 2022-05-05 NOTE — END OF VISIT
[] : Resident [FreeTextEntry3] : Here to establish care \par Pmhx of asthma, HTN, gout, HLD with hospitalization in march for AHRF 2/2 asthma exacerbation\par Asthma- stable after last exacerbation, following with pulm, PFTs pending \par Gout- cw current regimen, check BMP (Inpt Cr .96 -->1.0-->1.140 \par HTN- above goal, notes that he is normotensive at home, will keep BP log and RTC in 2 weeks with log and BP cuff, if elevated will rec uptitrating norvasc \par HLD- did not tolerate rosuvastatin, wants to try LSM for now\par colon cancer screening- colonoscopy referral placed

## 2022-05-06 LAB
ANION GAP SERPL CALC-SCNC: 12 MMOL/L
BUN SERPL-MCNC: 18 MG/DL
CALCIUM SERPL-MCNC: 9.5 MG/DL
CHLORIDE SERPL-SCNC: 101 MMOL/L
CO2 SERPL-SCNC: 26 MMOL/L
CREAT SERPL-MCNC: 1.27 MG/DL
EGFR: 63 ML/MIN/1.73M2
GLUCOSE SERPL-MCNC: 100 MG/DL
POTASSIUM SERPL-SCNC: 4.9 MMOL/L
SODIUM SERPL-SCNC: 139 MMOL/L

## 2022-05-09 LAB — SARS-COV-2 N GENE NPH QL NAA+PROBE: NOT DETECTED

## 2022-05-11 ENCOUNTER — APPOINTMENT (OUTPATIENT)
Dept: PULMONOLOGY | Facility: CLINIC | Age: 67
End: 2022-05-11
Payer: MEDICARE

## 2022-05-11 VITALS
HEART RATE: 76 BPM | TEMPERATURE: 97.2 F | HEIGHT: 72 IN | SYSTOLIC BLOOD PRESSURE: 181 MMHG | OXYGEN SATURATION: 100 % | DIASTOLIC BLOOD PRESSURE: 107 MMHG | BODY MASS INDEX: 26.95 KG/M2 | RESPIRATION RATE: 12 BRPM | WEIGHT: 199 LBS

## 2022-05-11 PROCEDURE — 94618 PULMONARY STRESS TESTING: CPT

## 2022-05-11 PROCEDURE — ZZZZZ: CPT

## 2022-05-11 PROCEDURE — 94010 BREATHING CAPACITY TEST: CPT

## 2022-05-11 PROCEDURE — 94726 PLETHYSMOGRAPHY LUNG VOLUMES: CPT

## 2022-05-11 PROCEDURE — 94729 DIFFUSING CAPACITY: CPT

## 2022-05-11 PROCEDURE — 99214 OFFICE O/P EST MOD 30 MIN: CPT | Mod: 25

## 2022-05-12 NOTE — PHYSICAL EXAM
[No Acute Distress] : no acute distress [Well Nourished] : well nourished [Normal Oropharynx] : normal oropharynx [II] : Mallampati Class: II [Normal Appearance] : normal appearance [No Neck Mass] : no neck mass [Normal Rate/Rhythm] : normal rate/rhythm [Normal S1, S2] : normal s1, s2 [No Resp Distress] : no resp distress [Clear to Auscultation Bilaterally] : clear to auscultation bilaterally [Benign] : benign [Not Tender] : not tender [Normal Gait] : normal gait [No Clubbing] : no clubbing [No Edema] : no edema [Normal Color/ Pigmentation] : normal color/ pigmentation [No Rash] : no rash [No Focal Deficits] : no focal deficits [No Sensory Deficits] : no sensory deficits [Oriented x3] : oriented x3 [Normal Affect] : normal affect

## 2022-05-12 NOTE — REVIEW OF SYSTEMS
[Fever] : no fever [Chills] : no chills [Dry Eyes] : no dry eyes [Eye Irritation] : no eye irritation [Nasal Congestion] : nasal congestion [Postnasal Drip] : postnasal drip [Cough] : no cough [Sputum] : no sputum [Chest Discomfort] : no chest discomfort [Orthopnea] : no orthopnea [Hay Fever] : no hay fever [Nasal Discharge] : no nasal discharge [GERD] : no gerd [Diarrhea] : no diarrhea [Nocturia] : no nocturia [Arthralgias] : no arthralgias [Trauma/ Injury] : no trauma/ injury

## 2022-05-12 NOTE — DISCUSSION/SUMMARY
[FreeTextEntry1] : 66 yo M with PMHx HTN, HLD, gout, asthma (no hx intubation), glaucoma BIBEMS to [facility] on 3/28/22 with SOB/wheezing found to have acute hypercapnic respiratory failure 2/2 asthma exacerbation in the setting of medication non-adherence (insurance did not cover Symbicort).\par \par Review:\par -Hospital records\par - Labs\par - CXR: No pneumonia\par - PFT ( 5/ 22): FEV1 71, FEV1/FVC 79  ,  , DLCO 80   , Rev  0%\par \par A/P\par Asthma:\par - Adavir HFA 2 puff bid with spacer to continue\par - Prn albuterol\par - PFT showed normal spirometry. \par \par Allergic Rhinitis:\par - Followed by immunology and allergy\par - He is on singulair and flonase nasal spray\par - Add Azelastine nasal spray twice daily.

## 2022-05-12 NOTE — HISTORY OF PRESENT ILLNESS
[Never] : never [TextBox_4] : 68 yo M with PMHx HTN, HLD, gout, asthma (no hx intubation), glaucoma BIBEMS to [facility] on 3/28 with SOB/wheezing found to have acute hypercapnic respiratory failure 2/2 asthma exacerbation in the setting of medication non-adherence (insurance did not cover Symbicort).\par Patient initially admitted to [facility] now off BiPAP, stable on RA, stepped down to RMF. While on RMF, patient remained on RA, ambulating without SOB/wheezing and satting 93% on ambulation.\par \par Today, patient came to clinic for follow up. Patient is feeling better. He is using Advair HFA without use of any albuterol. He feels better but still feels some tightness in his chest. \par \par 5/11/22:\par Patient is complaint with use of inhaler. He is c/o excessive congestion with nasal congestion.  [ESS] : 3

## 2022-05-26 ENCOUNTER — APPOINTMENT (OUTPATIENT)
Dept: INTERNAL MEDICINE | Facility: CLINIC | Age: 67
End: 2022-05-26

## 2022-08-12 ENCOUNTER — APPOINTMENT (OUTPATIENT)
Dept: PULMONOLOGY | Facility: CLINIC | Age: 67
End: 2022-08-12

## 2022-08-12 VITALS
SYSTOLIC BLOOD PRESSURE: 153 MMHG | HEIGHT: 72 IN | HEART RATE: 80 BPM | BODY MASS INDEX: 26.14 KG/M2 | DIASTOLIC BLOOD PRESSURE: 92 MMHG | WEIGHT: 193 LBS | OXYGEN SATURATION: 97 % | RESPIRATION RATE: 12 BRPM | TEMPERATURE: 97.5 F

## 2022-08-12 PROCEDURE — 99214 OFFICE O/P EST MOD 30 MIN: CPT

## 2022-08-12 NOTE — DISCUSSION/SUMMARY
[FreeTextEntry1] : 68 yo M with PMHx HTN, HLD, gout, asthma (no hx intubation), glaucoma BIBEMS to [facility] on 3/28/22 with SOB/wheezing found to have acute hypercapnic respiratory failure 2/2 asthma exacerbation in the setting of medication non-adherence (insurance did not cover Symbicort).\par \par Review:\par -Hospital records\par - Labs\par - CXR: No pneumonia\par - PFT ( 5/ 22): FEV1 71, FEV1/FVC 79  ,  , DLCO 80   , Rev  0%\par \par A/P\par Asthma:\par - No exacerbation since last visit\par - Adavir HFA 2 puff bid with spacer to continue\par - Prn albuterol\par - PFT showed normal spirometry. \par \par Allergic Rhinitis:\par - Followed by immunology and allergy\par - He is on singulair and flonase nasal spray\par - She feels better with addition of Azelastine nasal spray twice daily.\par - Follow up with ENT

## 2022-08-12 NOTE — HISTORY OF PRESENT ILLNESS
[Never] : never [TextBox_4] : 66 yo M with PMHx HTN, HLD, gout, asthma (no hx intubation), glaucoma BIBEMS to [facility] on 3/28 with SOB/wheezing found to have acute hypercapnic respiratory failure 2/2 asthma exacerbation in the setting of medication non-adherence (insurance did not cover Symbicort).\par Patient initially admitted to [facility] now off BiPAP, stable on RA, stepped down to RMF. While on RMF, patient remained on RA, ambulating without SOB/wheezing and satting 93% on ambulation.\par \par Today, patient came to clinic for follow up. Patient is feeling better. He is using Advair HFA without use of any albuterol. He feels better but still feels some tightness in his chest. \par \par 5/11/22:\par Patient is complaint with use of inhaler. He is c/o excessive congestion with nasal congestion. \par \par 8/12/22:\par No exacerbation. He is c/o increased congestion due to increase humidity. Used albuterol once or twice since last visit. [ESS] : 3

## 2022-08-12 NOTE — PHYSICAL EXAM
[No Acute Distress] : no acute distress [Well Nourished] : well nourished [Normal Oropharynx] : normal oropharynx [Erythema] : erythema [Nasal congestion] : nasal congestion [Turbinate hypertrophy] : turbinate hypertrophy [II] : Mallampati Class: II [Normal Appearance] : normal appearance [No Neck Mass] : no neck mass [Normal Rate/Rhythm] : normal rate/rhythm [Normal S1, S2] : normal s1, s2 [No Resp Distress] : no resp distress [Clear to Auscultation Bilaterally] : clear to auscultation bilaterally [Benign] : benign [Not Tender] : not tender [Normal Gait] : normal gait [No Clubbing] : no clubbing [No Edema] : no edema [Normal Color/ Pigmentation] : normal color/ pigmentation [No Rash] : no rash [No Focal Deficits] : no focal deficits [No Sensory Deficits] : no sensory deficits [Oriented x3] : oriented x3 [Normal Affect] : normal affect

## 2022-11-06 ENCOUNTER — RX RENEWAL (OUTPATIENT)
Age: 67
End: 2022-11-06

## 2023-05-16 ENCOUNTER — RX RENEWAL (OUTPATIENT)
Age: 68
End: 2023-05-16

## 2023-05-16 RX ORDER — FLUTICASONE PROPIONATE AND SALMETEROL XINAFOATE 230; 21 UG/1; UG/1
230-21 AEROSOL, METERED RESPIRATORY (INHALATION)
Qty: 1 | Refills: 3 | Status: ACTIVE | COMMUNITY
Start: 2022-04-08 | End: 1900-01-01

## 2023-05-26 ENCOUNTER — NON-APPOINTMENT (OUTPATIENT)
Age: 68
End: 2023-05-26

## 2023-05-26 NOTE — PHYSICAL EXAM
[No Acute Distress] : no acute distress [Normal Oropharynx] : normal oropharynx [Normal Rate/Rhythm] : normal rate/rhythm [Normal S1, S2] : normal s1, s2 [No Resp Distress] : no resp distress [Clear to Auscultation Bilaterally] : clear to auscultation bilaterally [No Clubbing] : no clubbing [No Cyanosis] : no cyanosis [No Edema] : no edema [FROM] : FROM [Oriented x3] : oriented x3 [Normal Affect] : normal affect

## 2023-05-30 ENCOUNTER — APPOINTMENT (OUTPATIENT)
Dept: PULMONOLOGY | Facility: CLINIC | Age: 68
End: 2023-05-30
Payer: MEDICARE

## 2023-05-30 VITALS
DIASTOLIC BLOOD PRESSURE: 95 MMHG | BODY MASS INDEX: 27.09 KG/M2 | WEIGHT: 200 LBS | TEMPERATURE: 97.8 F | OXYGEN SATURATION: 99 % | HEART RATE: 79 BPM | RESPIRATION RATE: 100 BRPM | SYSTOLIC BLOOD PRESSURE: 178 MMHG | HEIGHT: 72 IN

## 2023-05-30 DIAGNOSIS — J30.9 ALLERGIC RHINITIS, UNSPECIFIED: ICD-10-CM

## 2023-05-30 DIAGNOSIS — J45.909 UNSPECIFIED ASTHMA, UNCOMPLICATED: ICD-10-CM

## 2023-05-30 PROCEDURE — 99214 OFFICE O/P EST MOD 30 MIN: CPT

## 2023-05-30 RX ORDER — FLUTICASONE PROPIONATE 50 UG/1
50 SPRAY, METERED NASAL TWICE DAILY
Qty: 1 | Refills: 5 | Status: ACTIVE | COMMUNITY
Start: 2023-05-30 | End: 1900-01-01

## 2023-05-30 RX ORDER — AZELASTINE HYDROCHLORIDE 137 UG/1
137 SPRAY, METERED NASAL
Qty: 1 | Refills: 5 | Status: ACTIVE | COMMUNITY
Start: 2022-05-11 | End: 1900-01-01

## 2023-05-30 NOTE — DISCUSSION/SUMMARY
[FreeTextEntry1] : 66 yo M with PMHx HTN, HLD, gout, asthma (no hx intubation), glaucoma BIBEMS to [facility] on 3/28/22 with SOB/wheezing found to have acute hypercapnic respiratory failure 2/2 asthma exacerbation in the setting of medication non-adherence (insurance did not cover Symbicort).\par \par Review:\par -Hospital records\par - Labs\par - CXR: No pneumonia\par - PFT ( 5/ 22): FEV1 71, FEV1/FVC 79 ,  , DLCO 80 , Rev 0%\par \par A/P\par Asthma:\par - No exacerbation since last visit\par - Advair HFA 2 puff bid with spacer to continue\par - Prn albuterol\par - PFT showed normal spirometry in May 2022.\par - Plan for PFT during next visit in 6 months. \par \par Allergic Rhinitis:\par - Followed by immunology and allergy\par - Continue singulair, flonase and Azelastine nasal spray\par - Follow up with ENT.

## 2023-05-30 NOTE — REVIEW OF SYSTEMS
[Nasal Congestion] : nasal congestion [Postnasal Drip] : postnasal drip [Fever] : no fever [Chills] : no chills [Dry Eyes] : no dry eyes [Eye Irritation] : no eye irritation [Cough] : no cough [Sputum] : no sputum [Dyspnea] : no dyspnea [SOB on Exertion] : no sob on exertion [Chest Discomfort] : no chest discomfort [Edema] : no edema [Orthopnea] : no orthopnea [GERD] : no gerd [Diarrhea] : no diarrhea

## 2023-05-30 NOTE — HISTORY OF PRESENT ILLNESS
[Never] : never [TextBox_4] : 66 yo M with PMHx HTN, HLD, gout, asthma (no hx intubation), glaucoma BIBEMS to [facility] on 3/28 with SOB/wheezing found to have acute hypercapnic respiratory failure 2/2 asthma exacerbation in the setting of medication non-adherence (insurance did not cover Symbicort).\par Patient initially admitted to [facility] now off BiPAP, stable on RA, stepped down to RMF. While on RMF, patient remained on RA, ambulating without SOB/wheezing and satting 93% on ambulation.\par \par Today, patient came to clinic for follow up. Patient is feeling better. He is using Advair HFA without use of any albuterol. He feels better but still feels some tightness in his chest. \par \par 5/11/22:\par Patient is complaint with use of inhaler. He is c/o excessive congestion with nasal congestion. \par \par 8/12/22:\par No exacerbation. He is c/o increased congestion due to increase humidity. Used albuterol once or twice since last visit. \par \par 5/30/23:\par No exacerbation. Complaint with use of Advair, montelukast and nasal spray. Slightly increase chest congestion which is usual for him in month of June.  [ESS] : 3

## 2023-06-28 RX ORDER — FLUTICASONE PROPIONATE AND SALMETEROL 50; 250 UG/1; UG/1
250-50 POWDER RESPIRATORY (INHALATION)
Qty: 1 | Refills: 5 | Status: ACTIVE | COMMUNITY
Start: 2023-05-15 | End: 1900-01-01

## 2023-11-28 ENCOUNTER — APPOINTMENT (OUTPATIENT)
Dept: PULMONOLOGY | Facility: CLINIC | Age: 68
End: 2023-11-28

## 2024-06-30 ENCOUNTER — EMERGENCY (EMERGENCY)
Facility: HOSPITAL | Age: 69
LOS: 1 days | Discharge: HOME | End: 2024-06-30
Attending: EMERGENCY MEDICINE | Admitting: EMERGENCY MEDICINE
Payer: MEDICARE

## 2024-06-30 VITALS
TEMPERATURE: 101 F | RESPIRATION RATE: 16 BRPM | OXYGEN SATURATION: 94 % | DIASTOLIC BLOOD PRESSURE: 82 MMHG | HEART RATE: 102 BPM | WEIGHT: 197.98 LBS | SYSTOLIC BLOOD PRESSURE: 139 MMHG

## 2024-06-30 VITALS
RESPIRATION RATE: 18 BRPM | DIASTOLIC BLOOD PRESSURE: 90 MMHG | SYSTOLIC BLOOD PRESSURE: 179 MMHG | OXYGEN SATURATION: 98 % | HEART RATE: 85 BPM

## 2024-06-30 DIAGNOSIS — Z20.822 CONTACT WITH AND (SUSPECTED) EXPOSURE TO COVID-19: ICD-10-CM

## 2024-06-30 DIAGNOSIS — I10 ESSENTIAL (PRIMARY) HYPERTENSION: ICD-10-CM

## 2024-06-30 DIAGNOSIS — L29.9 PRURITUS, UNSPECIFIED: ICD-10-CM

## 2024-06-30 DIAGNOSIS — R06.02 SHORTNESS OF BREATH: ICD-10-CM

## 2024-06-30 DIAGNOSIS — M10.9 GOUT, UNSPECIFIED: ICD-10-CM

## 2024-06-30 DIAGNOSIS — R07.89 OTHER CHEST PAIN: ICD-10-CM

## 2024-06-30 DIAGNOSIS — J45.909 UNSPECIFIED ASTHMA, UNCOMPLICATED: ICD-10-CM

## 2024-06-30 DIAGNOSIS — E78.5 HYPERLIPIDEMIA, UNSPECIFIED: ICD-10-CM

## 2024-06-30 DIAGNOSIS — R50.9 FEVER, UNSPECIFIED: ICD-10-CM

## 2024-06-30 LAB
ALBUMIN SERPL ELPH-MCNC: 3.6 G/DL — SIGNIFICANT CHANGE UP (ref 3.3–5)
ALP SERPL-CCNC: 131 U/L — HIGH (ref 40–120)
ALT FLD-CCNC: 17 U/L — SIGNIFICANT CHANGE UP (ref 10–45)
ANION GAP SERPL CALC-SCNC: 13 MMOL/L — SIGNIFICANT CHANGE UP (ref 5–17)
ANISOCYTOSIS BLD QL: SLIGHT — SIGNIFICANT CHANGE UP
AST SERPL-CCNC: 23 U/L — SIGNIFICANT CHANGE UP (ref 10–40)
BASE EXCESS BLDV CALC-SCNC: 0.5 MMOL/L — SIGNIFICANT CHANGE UP (ref -2–3)
BASOPHILS # BLD AUTO: 0 K/UL — SIGNIFICANT CHANGE UP (ref 0–0.2)
BASOPHILS NFR BLD AUTO: 0 % — SIGNIFICANT CHANGE UP (ref 0–2)
BILIRUB SERPL-MCNC: 0.9 MG/DL — SIGNIFICANT CHANGE UP (ref 0.2–1.2)
BUN SERPL-MCNC: 9 MG/DL — SIGNIFICANT CHANGE UP (ref 7–23)
CA-I SERPL-SCNC: 1.09 MMOL/L — LOW (ref 1.15–1.33)
CALCIUM SERPL-MCNC: 8.5 MG/DL — SIGNIFICANT CHANGE UP (ref 8.4–10.5)
CHLORIDE SERPL-SCNC: 98 MMOL/L — SIGNIFICANT CHANGE UP (ref 96–108)
CO2 BLDV-SCNC: 25.7 MMOL/L — SIGNIFICANT CHANGE UP (ref 22–26)
CO2 SERPL-SCNC: 22 MMOL/L — SIGNIFICANT CHANGE UP (ref 22–31)
CREAT SERPL-MCNC: 1.06 MG/DL — SIGNIFICANT CHANGE UP (ref 0.5–1.3)
EGFR: 77 ML/MIN/1.73M2 — SIGNIFICANT CHANGE UP
EOSINOPHIL # BLD AUTO: 0 K/UL — SIGNIFICANT CHANGE UP (ref 0–0.5)
EOSINOPHIL NFR BLD AUTO: 0 % — SIGNIFICANT CHANGE UP (ref 0–6)
GAS PNL BLDV: 130 MMOL/L — LOW (ref 136–145)
GAS PNL BLDV: SIGNIFICANT CHANGE UP
GIANT PLATELETS BLD QL SMEAR: PRESENT — SIGNIFICANT CHANGE UP
GLUCOSE SERPL-MCNC: 122 MG/DL — HIGH (ref 70–99)
HCO3 BLDV-SCNC: 25 MMOL/L — SIGNIFICANT CHANGE UP (ref 22–29)
HCT VFR BLD CALC: 37.7 % — LOW (ref 39–50)
HGB BLD-MCNC: 13.5 G/DL — SIGNIFICANT CHANGE UP (ref 13–17)
LACTATE SERPL-SCNC: 1.9 MMOL/L — SIGNIFICANT CHANGE UP (ref 0.5–2)
LYMPHOCYTES # BLD AUTO: 30.3 % — SIGNIFICANT CHANGE UP (ref 13–44)
LYMPHOCYTES # BLD AUTO: 4.4 K/UL — HIGH (ref 1–3.3)
MANUAL SMEAR VERIFICATION: SIGNIFICANT CHANGE UP
MCHC RBC-ENTMCNC: 35.2 PG — HIGH (ref 27–34)
MCHC RBC-ENTMCNC: 35.8 GM/DL — SIGNIFICANT CHANGE UP (ref 32–36)
MCV RBC AUTO: 98.2 FL — SIGNIFICANT CHANGE UP (ref 80–100)
MONOCYTES # BLD AUTO: 0.52 K/UL — SIGNIFICANT CHANGE UP (ref 0–0.9)
MONOCYTES NFR BLD AUTO: 3.6 % — SIGNIFICANT CHANGE UP (ref 2–14)
NEUTROPHILS # BLD AUTO: 9.6 K/UL — HIGH (ref 1.8–7.4)
NEUTROPHILS NFR BLD AUTO: 66.1 % — SIGNIFICANT CHANGE UP (ref 43–77)
PCO2 BLDV: 37 MMHG — LOW (ref 42–55)
PH BLDV: 7.43 — SIGNIFICANT CHANGE UP (ref 7.32–7.43)
PLAT MORPH BLD: NORMAL — SIGNIFICANT CHANGE UP
PLATELET # BLD AUTO: 170 K/UL — SIGNIFICANT CHANGE UP (ref 150–400)
PO2 BLDV: 35 MMHG — SIGNIFICANT CHANGE UP (ref 25–45)
POLYCHROMASIA BLD QL SMEAR: SLIGHT — SIGNIFICANT CHANGE UP
POTASSIUM BLDV-SCNC: 3.7 MMOL/L — SIGNIFICANT CHANGE UP (ref 3.5–5.1)
POTASSIUM SERPL-MCNC: 3.6 MMOL/L — SIGNIFICANT CHANGE UP (ref 3.5–5.3)
POTASSIUM SERPL-SCNC: 3.6 MMOL/L — SIGNIFICANT CHANGE UP (ref 3.5–5.3)
PROT SERPL-MCNC: 6.3 G/DL — SIGNIFICANT CHANGE UP (ref 6–8.3)
RAPID RVP RESULT: SIGNIFICANT CHANGE UP
RBC # BLD: 3.84 M/UL — LOW (ref 4.2–5.8)
RBC # FLD: 12.2 % — SIGNIFICANT CHANGE UP (ref 10.3–14.5)
RBC BLD AUTO: ABNORMAL
SAO2 % BLDV: 58.8 % — LOW (ref 67–88)
SARS-COV-2 RNA SPEC QL NAA+PROBE: SIGNIFICANT CHANGE UP
SMUDGE CELLS # BLD: PRESENT — SIGNIFICANT CHANGE UP
SODIUM SERPL-SCNC: 133 MMOL/L — LOW (ref 135–145)
TROPONIN T, HIGH SENSITIVITY RESULT: 27 NG/L — SIGNIFICANT CHANGE UP (ref 0–51)
TROPONIN T, HIGH SENSITIVITY RESULT: 30 NG/L — SIGNIFICANT CHANGE UP (ref 0–51)
WBC # BLD: 14.53 K/UL — HIGH (ref 3.8–10.5)
WBC # FLD AUTO: 14.53 K/UL — HIGH (ref 3.8–10.5)

## 2024-06-30 PROCEDURE — 80053 COMPREHEN METABOLIC PANEL: CPT

## 2024-06-30 PROCEDURE — 99285 EMERGENCY DEPT VISIT HI MDM: CPT

## 2024-06-30 PROCEDURE — 99285 EMERGENCY DEPT VISIT HI MDM: CPT | Mod: 25

## 2024-06-30 PROCEDURE — 96374 THER/PROPH/DIAG INJ IV PUSH: CPT

## 2024-06-30 PROCEDURE — 84484 ASSAY OF TROPONIN QUANT: CPT

## 2024-06-30 PROCEDURE — 83605 ASSAY OF LACTIC ACID: CPT

## 2024-06-30 PROCEDURE — 36415 COLL VENOUS BLD VENIPUNCTURE: CPT

## 2024-06-30 PROCEDURE — 0225U NFCT DS DNA&RNA 21 SARSCOV2: CPT

## 2024-06-30 PROCEDURE — 84295 ASSAY OF SERUM SODIUM: CPT

## 2024-06-30 PROCEDURE — 82330 ASSAY OF CALCIUM: CPT

## 2024-06-30 PROCEDURE — 94640 AIRWAY INHALATION TREATMENT: CPT

## 2024-06-30 PROCEDURE — 82803 BLOOD GASES ANY COMBINATION: CPT

## 2024-06-30 PROCEDURE — 71045 X-RAY EXAM CHEST 1 VIEW: CPT | Mod: 26

## 2024-06-30 PROCEDURE — 85025 COMPLETE CBC W/AUTO DIFF WBC: CPT

## 2024-06-30 PROCEDURE — 87040 BLOOD CULTURE FOR BACTERIA: CPT

## 2024-06-30 PROCEDURE — 84132 ASSAY OF SERUM POTASSIUM: CPT

## 2024-06-30 PROCEDURE — 71045 X-RAY EXAM CHEST 1 VIEW: CPT

## 2024-06-30 RX ORDER — ALLOPURINOL 300 MG/1
1 TABLET ORAL
Qty: 60 | Refills: 0
Start: 2024-06-30 | End: 2024-07-29

## 2024-06-30 RX ORDER — ACETAMINOPHEN 325 MG
975 TABLET ORAL ONCE
Refills: 0 | Status: COMPLETED | OUTPATIENT
Start: 2024-06-30 | End: 2024-06-30

## 2024-06-30 RX ORDER — PREDNISONE 10 MG/1
1 TABLET ORAL
Qty: 10 | Refills: 0
Start: 2024-06-30 | End: 2024-07-04

## 2024-06-30 RX ORDER — IPRATROPIUM BROMIDE AND ALBUTEROL SULFATE .5; 3 MG/3ML; MG/3ML
3 SOLUTION RESPIRATORY (INHALATION)
Refills: 0 | Status: COMPLETED | OUTPATIENT
Start: 2024-06-30 | End: 2024-06-30

## 2024-06-30 RX ORDER — METHYLPREDNISOLONE ACETATE 20 MG/ML
125 VIAL (ML) INJECTION ONCE
Refills: 0 | Status: COMPLETED | OUTPATIENT
Start: 2024-06-30 | End: 2024-06-30

## 2024-06-30 RX ADMIN — IPRATROPIUM BROMIDE AND ALBUTEROL SULFATE 3 MILLILITER(S): .5; 3 SOLUTION RESPIRATORY (INHALATION) at 12:03

## 2024-06-30 RX ADMIN — Medication 125 MILLIGRAM(S): at 12:02

## 2024-06-30 RX ADMIN — IPRATROPIUM BROMIDE AND ALBUTEROL SULFATE 3 MILLILITER(S): .5; 3 SOLUTION RESPIRATORY (INHALATION) at 12:20

## 2024-06-30 RX ADMIN — IPRATROPIUM BROMIDE AND ALBUTEROL SULFATE 3 MILLILITER(S): .5; 3 SOLUTION RESPIRATORY (INHALATION) at 12:51

## 2024-06-30 RX ADMIN — Medication 975 MILLIGRAM(S): at 12:02

## 2025-01-22 NOTE — PATIENT PROFILE ADULT - ARRIVAL FROM
"Lab Results   Component Value Date    HGBA1C 6.5 (H) 08/11/2024       No results for input(s): \"POCGLU\" in the last 72 hours.    Blood Sugar Average: Last 72 hrs:    Diet-controlled. Reports that he has been exercising, losing weight over the past year.   Monitor accu-checks AC and HS.   SSI coverage.   Hypoglycemia protocol.    " Home